# Patient Record
Sex: FEMALE | Race: WHITE | NOT HISPANIC OR LATINO | Employment: FULL TIME | URBAN - METROPOLITAN AREA
[De-identification: names, ages, dates, MRNs, and addresses within clinical notes are randomized per-mention and may not be internally consistent; named-entity substitution may affect disease eponyms.]

---

## 2017-01-26 ENCOUNTER — OFFICE VISIT (OUTPATIENT)
Dept: URGENT CARE | Facility: PHYSICIAN GROUP | Age: 42
End: 2017-01-26
Payer: COMMERCIAL

## 2017-01-26 VITALS
TEMPERATURE: 98.2 F | HEIGHT: 65 IN | SYSTOLIC BLOOD PRESSURE: 120 MMHG | RESPIRATION RATE: 16 BRPM | DIASTOLIC BLOOD PRESSURE: 82 MMHG | WEIGHT: 140 LBS | BODY MASS INDEX: 23.32 KG/M2 | OXYGEN SATURATION: 100 % | HEART RATE: 76 BPM

## 2017-01-26 DIAGNOSIS — J02.9 PHARYNGITIS, UNSPECIFIED ETIOLOGY: ICD-10-CM

## 2017-01-26 PROCEDURE — 99213 OFFICE O/P EST LOW 20 MIN: CPT | Performed by: FAMILY MEDICINE

## 2017-01-26 RX ORDER — AMOXICILLIN 875 MG/1
875 TABLET, COATED ORAL 2 TIMES DAILY
Qty: 20 TAB | Refills: 0 | Status: SHIPPED | OUTPATIENT
Start: 2017-01-26 | End: 2017-02-05

## 2017-01-26 ASSESSMENT — ENCOUNTER SYMPTOMS
WEIGHT LOSS: 0
SENSORY CHANGE: 0
FOCAL WEAKNESS: 0

## 2017-01-26 NOTE — MR AVS SNAPSHOT
"        Dorothy Nova   2017 3:45 PM   Office Visit   MRN: [id number]    Department:  [facility]   Dept Phone:  [phone]    Description:  Female : 1975   Provider:  Frederick Gallardo M.D.           Reason for Visit     Pharyngitis x 2 days       Allergies as of 2017     No Known Allergies      You were diagnosed with     Pharyngitis, unspecified etiology   [[id number]]         Vital Signs     Blood Pressure Pulse Temperature Respirations Height Weight    120/82 mmHg 76 36.8 °C (98.2 °F) 16 1.651 m (5' 5\") 63.504 kg (140 lb)    Body Mass Index Oxygen Saturation Smoking Status             23.30 kg/m2 100% Never Smoker          Basic Information     Date Of Birth Sex Race Ethnicity Preferred Language    1975 Female White Non- English      Health Maintenance        Date Due Completion Dates    IMM DTaP/Tdap/Td Vaccine (1 - Tdap) 3/6/1996 ---    PAP SMEAR 3/6/1998 ---    IMM INFLUENZA (1) 2016 ---            Current Immunizations     No immunizations on file.      Below and/or attached are the medications your provider expects you to take. Review all of your home medications and newly ordered medications with your provider and/or pharmacist. Follow medication instructions as directed by your provider and/or pharmacist. Please keep your medication list with you and share with your provider. Update the information when medications are discontinued, doses are changed, or new medications (including over-the-counter products) are added; and carry medication information at all times in the event of emergency situations     Allergies:  No Known Allergies          Medications  Valid as of: 2017 -  4:12 PM    Generic Name Brand Name Tablet Size Instructions for use    Amoxicillin (Tab) AMOXIL 875 MG Take 1 Tab by mouth 2 times a day for 10 days.        Magnesium Oxide (Tab) MAG- MG Take 400 mg by mouth every day.        Omega 3-6-9 Fatty Acids   Take  by mouth.       " RaNITidine HCl (Tab) ZANTAC 150 MG Take 1 Tab by mouth 2 times a day.        .                 Medicines prescribed today were sent to:     [facility]/PHARMACY #[id number] - [place], [place] - [address] [address]    [address] [place] [address] [place] [address]    Phone: [phone] Fax: [phone]    Open 24 Hours?: No      Medication refill instructions:       If your prescription bottle indicates you have medication refills left, it is not necessary to call your provider’s office. Please contact your pharmacy and they will refill your medication.    If your prescription bottle indicates you do not have any refills left, you may request refills at any time through one of the following ways: The online eSeekers system (except Urgent Care), by calling your provider’s office, or by asking your pharmacy to contact your provider’s office with a refill request. Medication refills are processed only during regular business hours and may not be available until the next business day. Your provider may request additional information or to have a follow-up visit with you prior to refilling your medication.   *Please Note: Medication refills are assigned a new Rx number when refilled electronically. Your pharmacy may indicate that no refills were authorized even though a new prescription for the same medication is available at the pharmacy. Please request the medicine by name with the pharmacy before contacting your provider for a refill.           eSeekers Access Code: [email or web address]  Expires: 2/7/2017  9:00 AM    eSeekers  A secure, online tool to manage your health information     [employer]’s eSeekers® is a secure, online tool that connects you to your personalized health information from the privacy of your home -- day or night - making it very easy for you to manage your healthcare. Once the activation process is completed, you can even access your medical information using the eSeekers neville, which is available for free in the Apple [employer] store or  Google Play store.     [facility] provides the following levels of access (as shown below):   My Chart Features   [facility] Doctor [facility]  Specialists [facility]  Urgent  Care Non-[facility]  Primary Care  Doctor   Email your healthcare team securely and privately 24/7 X X X    Manage appointments: schedule your next appointment; view details of past/upcoming appointments X      Request prescription refills. X      View recent personal medical records, including lab and immunizations X X X X   View health record, including health history, allergies, medications X X X X   Read reports about your outpatient visits, procedures, consult and ER notes X X X X   See your discharge summary, which is a recap of your hospital and/or ER visit that includes your diagnosis, lab results, and care plan. X X       How to register for [facility]:  1. Go to  [email or web address].  2. Click on the Sign Up Now box, which takes you to the New Member Sign Up page. You will need to provide the following information:  a. Enter your [facility] Access Code exactly as it appears at the top of this page. (You will not need to use this code after you’ve completed the sign-up process. If you do not sign up before the expiration date, you must request a new code.)   b. Enter your date of birth.   c. Enter your home email address.   d. Click Submit, and follow the next screen’s instructions.  3. Create a [facility] ID. This will be your [facility] login ID and cannot be changed, so think of one that is secure and easy to remember.  4. Create a [facility] password. You can change your password at any time.  5. Enter your Password Reset Question and Answer. This can be used at a later time if you forget your password.   6. Enter your e-mail address. This allows you to receive e-mail notifications when new information is available in [facility].  7. Click Sign Up. You can now view your health information.    For assistance activating your [facility] account, call  [phone]

## 2017-01-27 NOTE — PROGRESS NOTES
"Subjective:      Dorothy Nova is a 41 y.o. female who presents with Pharyngitis            Pharyngitis   This is a new problem. Episode onset: 2 days right sided ST with blister and swelling. Swollen lymph nodes. No fever. Tolerating fluids with normal urine output. No rash. No known strep exposure. Right earache. No drainage. +PMH otitis media as adult. +PMH recurrent strep negative pharyngitis.       Review of Systems   Constitutional: Negative for weight loss and malaise/fatigue.   Skin: Negative for itching and rash.   Neurological: Negative for sensory change and focal weakness.     .  Medications, Allergies, and current problem list reviewed today in Epic       Objective:     /82 mmHg  Pulse 76  Temp(Src) 36.8 °C (98.2 °F)  Resp 16  Ht 1.651 m (5' 5\")  Wt 63.504 kg (140 lb)  BMI 23.30 kg/m2  SpO2 100%     Physical Exam   Constitutional: She appears well-developed and well-nourished. No distress.   HENT:   Head: Normocephalic and atraumatic.   Pharynx red without exudate. Small right soft palate aphthous ulcer.   Eyes: Conjunctivae are normal.   Neck: Neck supple.   Lymphadenopathy:     She has cervical adenopathy (shotty anterior).   Neurological:   Speech is clear. Patient is appropriate and cooperative.     Skin: Skin is warm and dry. No rash noted.               Assessment/Plan:     1. Pharyngitis, unspecified etiology  amoxicillin (AMOXIL) 875 MG tablet     Differential diagnosis, natural history, supportive care, and indications for immediate follow-up discussed at length.   High probability viral etiology discussed  Contingent antibiotic prescription given to patient to fill upon meeting criteria of guidelines discussed.       "

## 2017-03-19 ENCOUNTER — OFFICE VISIT (OUTPATIENT)
Dept: URGENT CARE | Facility: PHYSICIAN GROUP | Age: 42
End: 2017-03-19
Payer: COMMERCIAL

## 2017-03-19 VITALS
DIASTOLIC BLOOD PRESSURE: 72 MMHG | RESPIRATION RATE: 16 BRPM | HEIGHT: 65 IN | OXYGEN SATURATION: 98 % | TEMPERATURE: 98.7 F | BODY MASS INDEX: 23.32 KG/M2 | HEART RATE: 81 BPM | WEIGHT: 140 LBS | SYSTOLIC BLOOD PRESSURE: 124 MMHG

## 2017-03-19 DIAGNOSIS — R59.1 LYMPHADENOPATHY: ICD-10-CM

## 2017-03-19 DIAGNOSIS — J02.9 PHARYNGITIS, UNSPECIFIED ETIOLOGY: ICD-10-CM

## 2017-03-19 LAB
INT CON NEG: NORMAL
INT CON POS: NORMAL
S PYO AG THROAT QL: NORMAL

## 2017-03-19 PROCEDURE — 99214 OFFICE O/P EST MOD 30 MIN: CPT | Performed by: FAMILY MEDICINE

## 2017-03-19 PROCEDURE — 87880 STREP A ASSAY W/OPTIC: CPT | Performed by: FAMILY MEDICINE

## 2017-03-19 RX ORDER — DIPHENHYDRAMINE HYDROCHLORIDE AND LIDOCAINE HYDROCHLORIDE AND ALUMINUM HYDROXIDE AND MAGNESIUM HYDRO
KIT
Qty: 240 ML | Refills: 0 | Status: SHIPPED | OUTPATIENT
Start: 2017-03-19 | End: 2022-04-25

## 2017-03-19 RX ORDER — AMOXICILLIN 875 MG/1
875 TABLET, COATED ORAL 2 TIMES DAILY
Qty: 20 TAB | Refills: 0 | Status: SHIPPED | OUTPATIENT
Start: 2017-03-19 | End: 2017-03-29

## 2017-03-19 ASSESSMENT — ENCOUNTER SYMPTOMS
WEIGHT LOSS: 0
EYE DISCHARGE: 0
HEADACHES: 0
MYALGIAS: 1
EYE REDNESS: 0

## 2017-03-19 NOTE — MR AVS SNAPSHOT
"        Dorothy Nova   3/19/2017 10:45 AM   Office Visit   MRN: [id number]    Department:  [facility]   Dept Phone:  [phone]    Description:  Female : 1975   Provider:  Frederick Gallardo M.D.           Reason for Visit     Pharyngitis lump on the left side of neck and stiff neck x 3 days      Allergies as of 3/19/2017     No Known Allergies      You were diagnosed with     Pharyngitis, unspecified etiology   [[id number]]       Lymphadenopathy   [[id number]]         Vital Signs     Blood Pressure Pulse Temperature Respirations Height Weight    124/72 mmHg 81 37.1 °C (98.7 °F) 16 1.651 m (5' 5\") 63.504 kg (140 lb)    Body Mass Index Oxygen Saturation Smoking Status             23.30 kg/m2 98% Never Smoker          Basic Information     Date Of Birth Sex Race Ethnicity Preferred Language    1975 Female White Non- English      Health Maintenance        Date Due Completion Dates    IMM DTaP/Tdap/Td Vaccine (1 - Tdap) 3/6/1996 ---    PAP SMEAR 3/6/1998 ---    IMM INFLUENZA (1) 2016 ---    MAMMOGRAM 3/6/2017 ---            Current Immunizations     No immunizations on file.      Below and/or attached are the medications your provider expects you to take. Review all of your home medications and newly ordered medications with your provider and/or pharmacist. Follow medication instructions as directed by your provider and/or pharmacist. Please keep your medication list with you and share with your provider. Update the information when medications are discontinued, doses are changed, or new medications (including over-the-counter products) are added; and carry medication information at all times in the event of emergency situations     Allergies:  No Known Allergies          Medications  Valid as of: 2017 - 11:57 AM    Generic Name Brand Name Tablet Size Instructions for use    Amoxicillin (Tab) AMOXIL 875 MG Take 1 Tab by mouth 2 times a day for 10 days.       " DPH-Lido-AlHydr-MgHydr-Simeth (Suspension) MAGIC MOUTHWASH BLM  15ml-30ml gargle and spit every 3 hours as needed        Magnesium Oxide (Tab) MAG- MG Take 400 mg by mouth every day.        Omega 3-6-9 Fatty Acids   Take  by mouth.        RaNITidine HCl (Tab) ZANTAC 150 MG Take 1 Tab by mouth 2 times a day.        .                 Medicines prescribed today were sent to:     [facility]/PHARMACY #[id number] - [place], [place] - [address]    [address] [place] [address] [place] [address]    Phone: [phone] Fax: [phone]    Open 24 Hours?: No      Medication refill instructions:       If your prescription bottle indicates you have medication refills left, it is not necessary to call your provider’s office. Please contact your pharmacy and they will refill your medication.    If your prescription bottle indicates you do not have any refills left, you may request refills at any time through one of the following ways: The online HelloBooks system (except Urgent Care), by calling your provider’s office, or by asking your pharmacy to contact your provider’s office with a refill request. Medication refills are processed only during regular business hours and may not be available until the next business day. Your provider may request additional information or to have a follow-up visit with you prior to refilling your medication.   *Please Note: Medication refills are assigned a new Rx number when refilled electronically. Your pharmacy may indicate that no refills were authorized even though a new prescription for the same medication is available at the pharmacy. Please request the medicine by name with the pharmacy before contacting your provider for a refill.           [facility] Status: Patient Declined

## 2017-03-19 NOTE — PROGRESS NOTES
"Subjective:      Dorothy Nova is a 42 y.o. female who presents with Pharyngitis            Pharyngitis   This is a new problem. Episode onset: 3 days ST, swollen left tonsil with swollen left anterior cervical lymph node. No fever. No cough congestion. Tolerating fluids with normal urine output. No rash. Using OTC sudafed without relief . Associated symptoms include ear pain (left). Pertinent negatives include no headaches.   No other aggravating or alleviating factors.      Review of Systems   Constitutional: Negative for weight loss and malaise/fatigue.   HENT: Positive for ear pain (left).    Eyes: Negative for discharge and redness.   Musculoskeletal: Positive for myalgias.   Skin: Negative for itching and rash.   Neurological: Negative for headaches.   .  Medications, Allergies, and current problem list reviewed today in Epic         Objective:     /72 mmHg  Pulse 81  Temp(Src) 37.1 °C (98.7 °F)  Resp 16  Ht 1.651 m (5' 5\")  Wt 63.504 kg (140 lb)  BMI 23.30 kg/m2  SpO2 98%     Physical Exam   Constitutional: She appears well-developed and well-nourished. No distress.   HENT:   Head: Normocephalic and atraumatic.   Right Ear: External ear normal.   Left Ear: External ear normal.   Pharynx red without exudate     Eyes: Conjunctivae are normal.   Neck: Neck supple.   Lymphadenopathy:     She has cervical adenopathy (tender anterior).   Neurological:   Speech is clear. Patient is appropriate and cooperative.     Skin: Skin is warm and dry. No rash noted.               Assessment/Plan:     Strep negative    1. Pharyngitis, unspecified etiology  POCT Rapid Strep A   2. Lymphadenopathy  POCT Rapid Strep A     Differential diagnosis, natural history, supportive care, and indications for immediate follow-up discussed at length.   Contingent antibiotic prescription given to patient to fill upon meeting criteria of guidelines discussed.   Magic mouthwash prn    "

## 2018-08-08 ENCOUNTER — HOSPITAL ENCOUNTER (OUTPATIENT)
Dept: LAB | Facility: MEDICAL CENTER | Age: 43
End: 2018-08-08
Attending: FAMILY MEDICINE
Payer: COMMERCIAL

## 2018-08-08 ENCOUNTER — HOSPITAL ENCOUNTER (OUTPATIENT)
Dept: RADIOLOGY | Facility: MEDICAL CENTER | Age: 43
End: 2018-08-08
Attending: FAMILY MEDICINE
Payer: COMMERCIAL

## 2018-08-08 DIAGNOSIS — M25.541 JOINT PAIN IN FINGERS OF RIGHT HAND: ICD-10-CM

## 2018-08-08 LAB
BASOPHILS # BLD AUTO: 0.7 % (ref 0–1.8)
BASOPHILS # BLD: 0.05 K/UL (ref 0–0.12)
CRP SERPL HS-MCNC: 0.06 MG/DL (ref 0–0.75)
EOSINOPHIL # BLD AUTO: 0.14 K/UL (ref 0–0.51)
EOSINOPHIL NFR BLD: 1.9 % (ref 0–6.9)
ERYTHROCYTE [DISTWIDTH] IN BLOOD BY AUTOMATED COUNT: 44.6 FL (ref 35.9–50)
ERYTHROCYTE [SEDIMENTATION RATE] IN BLOOD BY WESTERGREN METHOD: 4 MM/HOUR (ref 0–20)
HCT VFR BLD AUTO: 44.8 % (ref 37–47)
HGB BLD-MCNC: 14.8 G/DL (ref 12–16)
IMM GRANULOCYTES # BLD AUTO: 0.02 K/UL (ref 0–0.11)
IMM GRANULOCYTES NFR BLD AUTO: 0.3 % (ref 0–0.9)
LYMPHOCYTES # BLD AUTO: 1.96 K/UL (ref 1–4.8)
LYMPHOCYTES NFR BLD: 26.6 % (ref 22–41)
MCH RBC QN AUTO: 32.2 PG (ref 27–33)
MCHC RBC AUTO-ENTMCNC: 33 G/DL (ref 33.6–35)
MCV RBC AUTO: 97.6 FL (ref 81.4–97.8)
MONOCYTES # BLD AUTO: 0.57 K/UL (ref 0–0.85)
MONOCYTES NFR BLD AUTO: 7.7 % (ref 0–13.4)
NEUTROPHILS # BLD AUTO: 4.62 K/UL (ref 2–7.15)
NEUTROPHILS NFR BLD: 62.8 % (ref 44–72)
NRBC # BLD AUTO: 0 K/UL
NRBC BLD-RTO: 0 /100 WBC
PLATELET # BLD AUTO: 358 K/UL (ref 164–446)
PMV BLD AUTO: 10.2 FL (ref 9–12.9)
RBC # BLD AUTO: 4.59 M/UL (ref 4.2–5.4)
RHEUMATOID FACT SER IA-ACNC: <10 IU/ML (ref 0–14)
WBC # BLD AUTO: 7.4 K/UL (ref 4.8–10.8)

## 2018-08-08 PROCEDURE — 73140 X-RAY EXAM OF FINGER(S): CPT | Mod: RT

## 2018-08-08 PROCEDURE — 86038 ANTINUCLEAR ANTIBODIES: CPT

## 2018-08-08 PROCEDURE — 85652 RBC SED RATE AUTOMATED: CPT

## 2018-08-08 PROCEDURE — 86431 RHEUMATOID FACTOR QUANT: CPT

## 2018-08-08 PROCEDURE — 86140 C-REACTIVE PROTEIN: CPT

## 2018-08-08 PROCEDURE — 85025 COMPLETE CBC W/AUTO DIFF WBC: CPT

## 2018-08-08 PROCEDURE — 36415 COLL VENOUS BLD VENIPUNCTURE: CPT

## 2018-08-09 LAB — NUCLEAR IGG SER QL IA: NORMAL

## 2020-07-17 ENCOUNTER — HOSPITAL ENCOUNTER (OUTPATIENT)
Dept: LAB | Facility: MEDICAL CENTER | Age: 45
End: 2020-07-17
Attending: PHYSICIAN ASSISTANT
Payer: COMMERCIAL

## 2020-07-17 PROCEDURE — 87624 HPV HI-RISK TYP POOLED RSLT: CPT

## 2020-07-17 PROCEDURE — 88175 CYTOPATH C/V AUTO FLUID REDO: CPT

## 2020-07-25 LAB
CYTOLOGY REG CYTOL: NORMAL
HPV HR 12 DNA CVX QL NAA+PROBE: NEGATIVE
HPV16 DNA SPEC QL NAA+PROBE: NEGATIVE
HPV18 DNA SPEC QL NAA+PROBE: NEGATIVE
SPECIMEN SOURCE: NORMAL

## 2022-03-07 ENCOUNTER — APPOINTMENT (OUTPATIENT)
Dept: RADIOLOGY | Facility: MEDICAL CENTER | Age: 47
End: 2022-03-07
Attending: NURSE PRACTITIONER
Payer: COMMERCIAL

## 2022-03-21 ENCOUNTER — HOSPITAL ENCOUNTER (OUTPATIENT)
Dept: RADIOLOGY | Facility: MEDICAL CENTER | Age: 47
End: 2022-03-21
Attending: NURSE PRACTITIONER
Payer: COMMERCIAL

## 2022-03-21 DIAGNOSIS — M79.672 FOOT PAIN, LEFT: ICD-10-CM

## 2022-03-21 PROCEDURE — A9576 INJ PROHANCE MULTIPACK: HCPCS | Performed by: NURSE PRACTITIONER

## 2022-03-21 PROCEDURE — 73720 MRI LWR EXTREMITY W/O&W/DYE: CPT | Mod: LT

## 2022-03-21 PROCEDURE — 700117 HCHG RX CONTRAST REV CODE 255: Performed by: NURSE PRACTITIONER

## 2022-03-21 RX ADMIN — GADOTERIDOL 10 ML: 279.3 INJECTION, SOLUTION INTRAVENOUS at 17:30

## 2022-03-23 PROBLEM — M79.672 FOOT PAIN, LEFT: Status: ACTIVE | Noted: 2022-03-23

## 2022-04-25 PROBLEM — M67.441 GANGLION OF HAND, RIGHT: Status: ACTIVE | Noted: 2022-04-25

## 2022-06-06 ENCOUNTER — HOSPITAL ENCOUNTER (OUTPATIENT)
Facility: MEDICAL CENTER | Age: 47
End: 2022-06-06
Attending: ANESTHESIOLOGY
Payer: COMMERCIAL

## 2022-06-06 LAB
COVID ORDER STATUS COVID19: NORMAL
SARS-COV-2 RNA RESP QL NAA+PROBE: NOTDETECTED
SPECIMEN SOURCE: NORMAL

## 2022-06-06 PROCEDURE — U0003 INFECTIOUS AGENT DETECTION BY NUCLEIC ACID (DNA OR RNA); SEVERE ACUTE RESPIRATORY SYNDROME CORONAVIRUS 2 (SARS-COV-2) (CORONAVIRUS DISEASE [COVID-19]), AMPLIFIED PROBE TECHNIQUE, MAKING USE OF HIGH THROUGHPUT TECHNOLOGIES AS DESCRIBED BY CMS-2020-01-R: HCPCS

## 2022-06-06 PROCEDURE — U0005 INFEC AGEN DETEC AMPLI PROBE: HCPCS

## 2022-06-09 PROBLEM — Z47.89 ORTHOPEDIC AFTERCARE: Status: ACTIVE | Noted: 2022-06-09

## 2023-04-12 DIAGNOSIS — M79.662 PAIN OF LEFT CALF: ICD-10-CM

## 2023-04-12 NOTE — PROGRESS NOTES
Acute onset left calf pain and swelling with extension to the distal thigh. Tenderness along the venous distribution. Wells Score: 1. D-dimer as pt is in low risk group.

## 2023-07-17 PROBLEM — R22.31 MASS OF FINGER OF RIGHT HAND: Status: ACTIVE | Noted: 2023-07-17

## 2024-09-15 ENCOUNTER — APPOINTMENT (OUTPATIENT)
Dept: RADIOLOGY | Facility: MEDICAL CENTER | Age: 49
DRG: 418 | End: 2024-09-15
Attending: EMERGENCY MEDICINE
Payer: COMMERCIAL

## 2024-09-15 ENCOUNTER — APPOINTMENT (OUTPATIENT)
Dept: RADIOLOGY | Facility: MEDICAL CENTER | Age: 49
DRG: 418 | End: 2024-09-15
Attending: INTERNAL MEDICINE
Payer: COMMERCIAL

## 2024-09-15 ENCOUNTER — HOSPITAL ENCOUNTER (INPATIENT)
Facility: MEDICAL CENTER | Age: 49
LOS: 2 days | DRG: 418 | End: 2024-09-17
Attending: EMERGENCY MEDICINE | Admitting: INTERNAL MEDICINE
Payer: COMMERCIAL

## 2024-09-15 DIAGNOSIS — K92.1 GASTROINTESTINAL HEMORRHAGE WITH MELENA: ICD-10-CM

## 2024-09-15 DIAGNOSIS — K82.1 GALLBLADDER HYDROPS: ICD-10-CM

## 2024-09-15 DIAGNOSIS — R10.13 EPIGASTRIC PAIN: ICD-10-CM

## 2024-09-15 DIAGNOSIS — K81.0 ACUTE CHOLECYSTITIS: ICD-10-CM

## 2024-09-15 DIAGNOSIS — D72.829 LEUKOCYTOSIS, UNSPECIFIED TYPE: ICD-10-CM

## 2024-09-15 DIAGNOSIS — K92.2 ACUTE UPPER GI BLEED: Primary | ICD-10-CM

## 2024-09-15 PROBLEM — R09.02 HYPOXIA: Status: ACTIVE | Noted: 2024-09-15

## 2024-09-15 LAB
ABO + RH BLD: NORMAL
ABO GROUP BLD: NORMAL
ALBUMIN SERPL BCP-MCNC: 4.4 G/DL (ref 3.2–4.9)
ALBUMIN/GLOB SERPL: 1.6 G/DL
ALP SERPL-CCNC: 55 U/L (ref 30–99)
ALT SERPL-CCNC: 13 U/L (ref 2–50)
ANION GAP SERPL CALC-SCNC: 14 MMOL/L (ref 7–16)
APPEARANCE UR: CLEAR
AST SERPL-CCNC: 25 U/L (ref 12–45)
BASOPHILS # BLD AUTO: 0.3 % (ref 0–1.8)
BASOPHILS # BLD: 0.05 K/UL (ref 0–0.12)
BILIRUB SERPL-MCNC: 0.7 MG/DL (ref 0.1–1.5)
BILIRUB UR QL STRIP.AUTO: NEGATIVE
BLD GP AB SCN SERPL QL: NORMAL
BUN SERPL-MCNC: 13 MG/DL (ref 8–22)
CALCIUM ALBUM COR SERPL-MCNC: 9.2 MG/DL (ref 8.5–10.5)
CALCIUM SERPL-MCNC: 9.5 MG/DL (ref 8.5–10.5)
CHLORIDE SERPL-SCNC: 99 MMOL/L (ref 96–112)
CO2 SERPL-SCNC: 23 MMOL/L (ref 20–33)
COLOR UR: YELLOW
CREAT SERPL-MCNC: 0.77 MG/DL (ref 0.5–1.4)
EKG IMPRESSION: NORMAL
EOSINOPHIL # BLD AUTO: 0 K/UL (ref 0–0.51)
EOSINOPHIL NFR BLD: 0 % (ref 0–6.9)
ERYTHROCYTE [DISTWIDTH] IN BLOOD BY AUTOMATED COUNT: 41.2 FL (ref 35.9–50)
GFR SERPLBLD CREATININE-BSD FMLA CKD-EPI: 95 ML/MIN/1.73 M 2
GLOBULIN SER CALC-MCNC: 2.8 G/DL (ref 1.9–3.5)
GLUCOSE SERPL-MCNC: 121 MG/DL (ref 65–99)
GLUCOSE UR STRIP.AUTO-MCNC: NEGATIVE MG/DL
HCT VFR BLD AUTO: 41.6 % (ref 37–47)
HGB BLD-MCNC: 11.1 G/DL (ref 12–16)
HGB BLD-MCNC: 12.2 G/DL (ref 12–16)
HGB BLD-MCNC: 14.6 G/DL (ref 12–16)
IMM GRANULOCYTES # BLD AUTO: 0.08 K/UL (ref 0–0.11)
IMM GRANULOCYTES NFR BLD AUTO: 0.5 % (ref 0–0.9)
KETONES UR STRIP.AUTO-MCNC: NEGATIVE MG/DL
LEUKOCYTE ESTERASE UR QL STRIP.AUTO: NEGATIVE
LIPASE SERPL-CCNC: 24 U/L (ref 11–82)
LYMPHOCYTES # BLD AUTO: 0.91 K/UL (ref 1–4.8)
LYMPHOCYTES NFR BLD: 5.7 % (ref 22–41)
MCH RBC QN AUTO: 32.1 PG (ref 27–33)
MCHC RBC AUTO-ENTMCNC: 35.1 G/DL (ref 32.2–35.5)
MCV RBC AUTO: 91.4 FL (ref 81.4–97.8)
MICRO URNS: NORMAL
MONOCYTES # BLD AUTO: 0.71 K/UL (ref 0–0.85)
MONOCYTES NFR BLD AUTO: 4.5 % (ref 0–13.4)
NEUTROPHILS # BLD AUTO: 14.12 K/UL (ref 1.82–7.42)
NEUTROPHILS NFR BLD: 89 % (ref 44–72)
NITRITE UR QL STRIP.AUTO: NEGATIVE
NRBC # BLD AUTO: 0 K/UL
NRBC BLD-RTO: 0 /100 WBC (ref 0–0.2)
PH UR STRIP.AUTO: 7 [PH] (ref 5–8)
PLATELET # BLD AUTO: 337 K/UL (ref 164–446)
PMV BLD AUTO: 9.4 FL (ref 9–12.9)
POTASSIUM SERPL-SCNC: 3.8 MMOL/L (ref 3.6–5.5)
PROT SERPL-MCNC: 7.2 G/DL (ref 6–8.2)
PROT UR QL STRIP: NEGATIVE MG/DL
RBC # BLD AUTO: 4.55 M/UL (ref 4.2–5.4)
RBC UR QL AUTO: NEGATIVE
RH BLD: NORMAL
SODIUM SERPL-SCNC: 136 MMOL/L (ref 135–145)
SP GR UR STRIP.AUTO: 1.01
TROPONIN T SERPL-MCNC: <6 NG/L (ref 6–19)
UROBILINOGEN UR STRIP.AUTO-MCNC: 0.2 MG/DL
WBC # BLD AUTO: 15.9 K/UL (ref 4.8–10.8)

## 2024-09-15 PROCEDURE — 86901 BLOOD TYPING SEROLOGIC RH(D): CPT

## 2024-09-15 PROCEDURE — 99223 1ST HOSP IP/OBS HIGH 75: CPT | Performed by: INTERNAL MEDICINE

## 2024-09-15 PROCEDURE — 700105 HCHG RX REV CODE 258: Performed by: INTERNAL MEDICINE

## 2024-09-15 PROCEDURE — 81003 URINALYSIS AUTO W/O SCOPE: CPT

## 2024-09-15 PROCEDURE — 93005 ELECTROCARDIOGRAM TRACING: CPT | Performed by: EMERGENCY MEDICINE

## 2024-09-15 PROCEDURE — 85025 COMPLETE CBC W/AUTO DIFF WBC: CPT

## 2024-09-15 PROCEDURE — 85018 HEMOGLOBIN: CPT

## 2024-09-15 PROCEDURE — 770006 HCHG ROOM/CARE - MED/SURG/GYN SEMI*

## 2024-09-15 PROCEDURE — 86900 BLOOD TYPING SEROLOGIC ABO: CPT

## 2024-09-15 PROCEDURE — 80053 COMPREHEN METABOLIC PANEL: CPT

## 2024-09-15 PROCEDURE — 99222 1ST HOSP IP/OBS MODERATE 55: CPT | Performed by: SURGERY

## 2024-09-15 PROCEDURE — 86850 RBC ANTIBODY SCREEN: CPT

## 2024-09-15 PROCEDURE — 94760 N-INVAS EAR/PLS OXIMETRY 1: CPT

## 2024-09-15 PROCEDURE — 700117 HCHG RX CONTRAST REV CODE 255: Performed by: EMERGENCY MEDICINE

## 2024-09-15 PROCEDURE — 76705 ECHO EXAM OF ABDOMEN: CPT

## 2024-09-15 PROCEDURE — 83690 ASSAY OF LIPASE: CPT

## 2024-09-15 PROCEDURE — 700102 HCHG RX REV CODE 250 W/ 637 OVERRIDE(OP): Performed by: INTERNAL MEDICINE

## 2024-09-15 PROCEDURE — 700102 HCHG RX REV CODE 250 W/ 637 OVERRIDE(OP): Performed by: EMERGENCY MEDICINE

## 2024-09-15 PROCEDURE — 36415 COLL VENOUS BLD VENIPUNCTURE: CPT

## 2024-09-15 PROCEDURE — A9270 NON-COVERED ITEM OR SERVICE: HCPCS | Mod: JZ | Performed by: INTERNAL MEDICINE

## 2024-09-15 PROCEDURE — 700111 HCHG RX REV CODE 636 W/ 250 OVERRIDE (IP): Performed by: EMERGENCY MEDICINE

## 2024-09-15 PROCEDURE — A9270 NON-COVERED ITEM OR SERVICE: HCPCS | Performed by: EMERGENCY MEDICINE

## 2024-09-15 PROCEDURE — 99285 EMERGENCY DEPT VISIT HI MDM: CPT

## 2024-09-15 PROCEDURE — 84484 ASSAY OF TROPONIN QUANT: CPT

## 2024-09-15 PROCEDURE — A9270 NON-COVERED ITEM OR SERVICE: HCPCS | Performed by: INTERNAL MEDICINE

## 2024-09-15 PROCEDURE — 99254 IP/OBS CNSLTJ NEW/EST MOD 60: CPT | Performed by: INTERNAL MEDICINE

## 2024-09-15 PROCEDURE — 96374 THER/PROPH/DIAG INJ IV PUSH: CPT

## 2024-09-15 PROCEDURE — 700111 HCHG RX REV CODE 636 W/ 250 OVERRIDE (IP): Performed by: INTERNAL MEDICINE

## 2024-09-15 PROCEDURE — 700102 HCHG RX REV CODE 250 W/ 637 OVERRIDE(OP): Mod: JZ | Performed by: INTERNAL MEDICINE

## 2024-09-15 PROCEDURE — 96375 TX/PRO/DX INJ NEW DRUG ADDON: CPT

## 2024-09-15 PROCEDURE — 74177 CT ABD & PELVIS W/CONTRAST: CPT

## 2024-09-15 RX ORDER — POTASSIUM CHLORIDE 1500 MG/1
20 TABLET, EXTENDED RELEASE ORAL ONCE
Status: COMPLETED | OUTPATIENT
Start: 2024-09-15 | End: 2024-09-15

## 2024-09-15 RX ORDER — ONDANSETRON 2 MG/ML
4 INJECTION INTRAMUSCULAR; INTRAVENOUS EVERY 4 HOURS PRN
Status: DISCONTINUED | OUTPATIENT
Start: 2024-09-15 | End: 2024-09-17 | Stop reason: HOSPADM

## 2024-09-15 RX ORDER — AMOXICILLIN 250 MG
2 CAPSULE ORAL EVERY EVENING
Status: DISCONTINUED | OUTPATIENT
Start: 2024-09-15 | End: 2024-09-17 | Stop reason: HOSPADM

## 2024-09-15 RX ORDER — DEXTROAMPHETAMINE SACCHARATE, AMPHETAMINE ASPARTATE, DEXTROAMPHETAMINE SULFATE AND AMPHETAMINE SULFATE 2.5; 2.5; 2.5; 2.5 MG/1; MG/1; MG/1; MG/1
10 TABLET ORAL DAILY
Status: DISCONTINUED | OUTPATIENT
Start: 2024-09-15 | End: 2024-09-17 | Stop reason: HOSPADM

## 2024-09-15 RX ORDER — OXYCODONE HYDROCHLORIDE 5 MG/1
2.5 TABLET ORAL
Status: DISCONTINUED | OUTPATIENT
Start: 2024-09-15 | End: 2024-09-17 | Stop reason: HOSPADM

## 2024-09-15 RX ORDER — LABETALOL HYDROCHLORIDE 5 MG/ML
10 INJECTION, SOLUTION INTRAVENOUS EVERY 4 HOURS PRN
Status: DISCONTINUED | OUTPATIENT
Start: 2024-09-15 | End: 2024-09-17 | Stop reason: HOSPADM

## 2024-09-15 RX ORDER — ONDANSETRON 2 MG/ML
4 INJECTION INTRAMUSCULAR; INTRAVENOUS ONCE
Status: COMPLETED | OUTPATIENT
Start: 2024-09-15 | End: 2024-09-15

## 2024-09-15 RX ORDER — PROMETHAZINE HYDROCHLORIDE 25 MG/1
12.5-25 TABLET ORAL EVERY 4 HOURS PRN
Status: DISCONTINUED | OUTPATIENT
Start: 2024-09-15 | End: 2024-09-17 | Stop reason: HOSPADM

## 2024-09-15 RX ORDER — PANTOPRAZOLE SODIUM 40 MG/10ML
80 INJECTION, POWDER, LYOPHILIZED, FOR SOLUTION INTRAVENOUS ONCE
Status: COMPLETED | OUTPATIENT
Start: 2024-09-15 | End: 2024-09-15

## 2024-09-15 RX ORDER — ACETAMINOPHEN 325 MG/1
650 TABLET ORAL EVERY 6 HOURS PRN
Status: DISCONTINUED | OUTPATIENT
Start: 2024-09-15 | End: 2024-09-17 | Stop reason: HOSPADM

## 2024-09-15 RX ORDER — SUCRALFATE 1 G/1
1 TABLET ORAL EVERY 6 HOURS
Status: COMPLETED | OUTPATIENT
Start: 2024-09-15 | End: 2024-09-16

## 2024-09-15 RX ORDER — MORPHINE SULFATE 4 MG/ML
2 INJECTION INTRAVENOUS
Status: DISCONTINUED | OUTPATIENT
Start: 2024-09-15 | End: 2024-09-17 | Stop reason: HOSPADM

## 2024-09-15 RX ORDER — LIOTHYRONINE SODIUM 5 UG/1
5 TABLET ORAL
Status: DISCONTINUED | OUTPATIENT
Start: 2024-09-15 | End: 2024-09-15

## 2024-09-15 RX ORDER — HYDROMORPHONE HYDROCHLORIDE 1 MG/ML
1 INJECTION, SOLUTION INTRAMUSCULAR; INTRAVENOUS; SUBCUTANEOUS ONCE
Status: COMPLETED | OUTPATIENT
Start: 2024-09-15 | End: 2024-09-15

## 2024-09-15 RX ORDER — ONDANSETRON 4 MG/1
4 TABLET, ORALLY DISINTEGRATING ORAL EVERY 4 HOURS PRN
Status: DISCONTINUED | OUTPATIENT
Start: 2024-09-15 | End: 2024-09-17 | Stop reason: HOSPADM

## 2024-09-15 RX ORDER — PANTOPRAZOLE SODIUM 40 MG/10ML
40 INJECTION, POWDER, LYOPHILIZED, FOR SOLUTION INTRAVENOUS 2 TIMES DAILY
Status: DISCONTINUED | OUTPATIENT
Start: 2024-09-15 | End: 2024-09-17 | Stop reason: HOSPADM

## 2024-09-15 RX ORDER — PROCHLORPERAZINE EDISYLATE 5 MG/ML
5-10 INJECTION INTRAMUSCULAR; INTRAVENOUS EVERY 4 HOURS PRN
Status: DISCONTINUED | OUTPATIENT
Start: 2024-09-15 | End: 2024-09-17 | Stop reason: HOSPADM

## 2024-09-15 RX ORDER — FAMOTIDINE 20 MG/1
20 TABLET, FILM COATED ORAL
Status: ON HOLD | COMMUNITY
End: 2024-09-17

## 2024-09-15 RX ORDER — CALCIUM CARBONATE 500 MG/1
500 TABLET, CHEWABLE ORAL PRN
COMMUNITY

## 2024-09-15 RX ORDER — OXYCODONE HYDROCHLORIDE 5 MG/1
5 TABLET ORAL
Status: DISCONTINUED | OUTPATIENT
Start: 2024-09-15 | End: 2024-09-17 | Stop reason: HOSPADM

## 2024-09-15 RX ORDER — SODIUM CHLORIDE 9 MG/ML
INJECTION, SOLUTION INTRAVENOUS CONTINUOUS
Status: DISCONTINUED | OUTPATIENT
Start: 2024-09-15 | End: 2024-09-17 | Stop reason: HOSPADM

## 2024-09-15 RX ORDER — POLYETHYLENE GLYCOL 3350 17 G/17G
1 POWDER, FOR SOLUTION ORAL
Status: DISCONTINUED | OUTPATIENT
Start: 2024-09-15 | End: 2024-09-17 | Stop reason: HOSPADM

## 2024-09-15 RX ORDER — PROMETHAZINE HYDROCHLORIDE 25 MG/1
12.5-25 SUPPOSITORY RECTAL EVERY 4 HOURS PRN
Status: DISCONTINUED | OUTPATIENT
Start: 2024-09-15 | End: 2024-09-17 | Stop reason: HOSPADM

## 2024-09-15 RX ADMIN — SODIUM CHLORIDE: 9 INJECTION, SOLUTION INTRAVENOUS at 06:26

## 2024-09-15 RX ADMIN — PANTOPRAZOLE SODIUM 80 MG: 40 INJECTION, POWDER, FOR SOLUTION INTRAVENOUS at 04:42

## 2024-09-15 RX ADMIN — ONDANSETRON 4 MG: 2 INJECTION INTRAMUSCULAR; INTRAVENOUS at 11:25

## 2024-09-15 RX ADMIN — OXYCODONE HYDROCHLORIDE 5 MG: 5 TABLET ORAL at 15:15

## 2024-09-15 RX ADMIN — SUCRALFATE 1 G: 1 TABLET ORAL at 17:24

## 2024-09-15 RX ADMIN — POTASSIUM CHLORIDE 20 MEQ: 1500 TABLET, EXTENDED RELEASE ORAL at 08:37

## 2024-09-15 RX ADMIN — HYDROMORPHONE HYDROCHLORIDE 1 MG: 1 INJECTION, SOLUTION INTRAMUSCULAR; INTRAVENOUS; SUBCUTANEOUS at 03:51

## 2024-09-15 RX ADMIN — ACETAMINOPHEN 650 MG: 325 TABLET ORAL at 15:17

## 2024-09-15 RX ADMIN — ONDANSETRON 4 MG: 2 INJECTION INTRAMUSCULAR; INTRAVENOUS at 03:51

## 2024-09-15 RX ADMIN — SUCRALFATE 1 G: 1 TABLET ORAL at 11:28

## 2024-09-15 RX ADMIN — PANTOPRAZOLE SODIUM 40 MG: 40 INJECTION, POWDER, FOR SOLUTION INTRAVENOUS at 17:24

## 2024-09-15 RX ADMIN — OXYCODONE HYDROCHLORIDE 5 MG: 5 TABLET ORAL at 07:11

## 2024-09-15 RX ADMIN — OXYCODONE HYDROCHLORIDE 5 MG: 5 TABLET ORAL at 11:28

## 2024-09-15 RX ADMIN — IOHEXOL 80 ML: 350 INJECTION, SOLUTION INTRAVENOUS at 04:45

## 2024-09-15 RX ADMIN — SUCRALFATE 1 G: 1 TABLET ORAL at 06:25

## 2024-09-15 RX ADMIN — SODIUM CHLORIDE: 9 INJECTION, SOLUTION INTRAVENOUS at 23:26

## 2024-09-15 RX ADMIN — SUCRALFATE 1 G: 1 TABLET ORAL at 23:22

## 2024-09-15 RX ADMIN — LIDOCAINE HYDROCHLORIDE 30 ML: 20 SOLUTION ORAL; TOPICAL at 03:38

## 2024-09-15 RX ADMIN — OXYCODONE HYDROCHLORIDE 5 MG: 5 TABLET ORAL at 23:21

## 2024-09-15 RX ADMIN — SENNOSIDES AND DOCUSATE SODIUM 2 TABLET: 50; 8.6 TABLET ORAL at 17:24

## 2024-09-15 RX ADMIN — OXYCODONE HYDROCHLORIDE 5 MG: 5 TABLET ORAL at 19:05

## 2024-09-15 SDOH — ECONOMIC STABILITY: TRANSPORTATION INSECURITY
IN THE PAST 12 MONTHS, HAS LACK OF RELIABLE TRANSPORTATION KEPT YOU FROM MEDICAL APPOINTMENTS, MEETINGS, WORK OR FROM GETTING THINGS NEEDED FOR DAILY LIVING?: NO

## 2024-09-15 SDOH — ECONOMIC STABILITY: TRANSPORTATION INSECURITY
IN THE PAST 12 MONTHS, HAS THE LACK OF TRANSPORTATION KEPT YOU FROM MEDICAL APPOINTMENTS OR FROM GETTING MEDICATIONS?: NO

## 2024-09-15 ASSESSMENT — ENCOUNTER SYMPTOMS
FEVER: 0
HEMOPTYSIS: 0
FOCAL WEAKNESS: 0
HALLUCINATIONS: 0
FLANK PAIN: 0
CHILLS: 0
SPUTUM PRODUCTION: 0
NERVOUS/ANXIOUS: 0
VOMITING: 0
BACK PAIN: 0
NAUSEA: 1
POLYDIPSIA: 0
WEIGHT LOSS: 0
BLURRED VISION: 0
BLOOD IN STOOL: 0
DOUBLE VISION: 0
COUGH: 0
DIARRHEA: 0
SPEECH CHANGE: 0
PHOTOPHOBIA: 0
BRUISES/BLEEDS EASILY: 0
ABDOMINAL PAIN: 1
NECK PAIN: 0
TREMORS: 0
PALPITATIONS: 0
HEARTBURN: 0
ORTHOPNEA: 0
CONSTIPATION: 0
HEADACHES: 0

## 2024-09-15 ASSESSMENT — PAIN DESCRIPTION - PAIN TYPE
TYPE: ACUTE PAIN

## 2024-09-15 ASSESSMENT — LIFESTYLE VARIABLES
AVERAGE NUMBER OF DAYS PER WEEK YOU HAVE A DRINK CONTAINING ALCOHOL: 2
EVER HAD A DRINK FIRST THING IN THE MORNING TO STEADY YOUR NERVES TO GET RID OF A HANGOVER: NO
HAVE PEOPLE ANNOYED YOU BY CRITICIZING YOUR DRINKING: NO
CONSUMPTION TOTAL: NEGATIVE
TOTAL SCORE: 0
TOTAL SCORE: 0
HAVE YOU EVER FELT YOU SHOULD CUT DOWN ON YOUR DRINKING: NO
EVER FELT BAD OR GUILTY ABOUT YOUR DRINKING: NO
ON A TYPICAL DAY WHEN YOU DRINK ALCOHOL HOW MANY DRINKS DO YOU HAVE: 1
TOTAL SCORE: 0
HOW MANY TIMES IN THE PAST YEAR HAVE YOU HAD 5 OR MORE DRINKS IN A DAY: 0
ALCOHOL_USE: YES
DOES PATIENT WANT TO STOP DRINKING: NO
SUBSTANCE_ABUSE: 0

## 2024-09-15 ASSESSMENT — COGNITIVE AND FUNCTIONAL STATUS - GENERAL
MOBILITY SCORE: 24
SUGGESTED CMS G CODE MODIFIER DAILY ACTIVITY: CH
DAILY ACTIVITIY SCORE: 24
SUGGESTED CMS G CODE MODIFIER MOBILITY: CH

## 2024-09-15 ASSESSMENT — SOCIAL DETERMINANTS OF HEALTH (SDOH)
IN THE PAST 12 MONTHS, HAS THE ELECTRIC, GAS, OIL, OR WATER COMPANY THREATENED TO SHUT OFF SERVICE IN YOUR HOME?: NO
WITHIN THE LAST YEAR, HAVE YOU BEEN HUMILIATED OR EMOTIONALLY ABUSED IN OTHER WAYS BY YOUR PARTNER OR EX-PARTNER?: NO
WITHIN THE LAST YEAR, HAVE YOU BEEN AFRAID OF YOUR PARTNER OR EX-PARTNER?: NO
WITHIN THE PAST 12 MONTHS, YOU WORRIED THAT YOUR FOOD WOULD RUN OUT BEFORE YOU GOT THE MONEY TO BUY MORE: NEVER TRUE
WITHIN THE LAST YEAR, HAVE TO BEEN RAPED OR FORCED TO HAVE ANY KIND OF SEXUAL ACTIVITY BY YOUR PARTNER OR EX-PARTNER?: NO
WITHIN THE PAST 12 MONTHS, THE FOOD YOU BOUGHT JUST DIDN'T LAST AND YOU DIDN'T HAVE MONEY TO GET MORE: NEVER TRUE
WITHIN THE LAST YEAR, HAVE YOU BEEN KICKED, HIT, SLAPPED, OR OTHERWISE PHYSICALLY HURT BY YOUR PARTNER OR EX-PARTNER?: NO

## 2024-09-15 ASSESSMENT — PATIENT HEALTH QUESTIONNAIRE - PHQ9
2. FEELING DOWN, DEPRESSED, IRRITABLE, OR HOPELESS: NOT AT ALL
1. LITTLE INTEREST OR PLEASURE IN DOING THINGS: NOT AT ALL
SUM OF ALL RESPONSES TO PHQ9 QUESTIONS 1 AND 2: 0

## 2024-09-15 ASSESSMENT — FIBROSIS 4 INDEX: FIB4 SCORE: 0.97

## 2024-09-15 NOTE — PROGRESS NOTES
Report received from ER nurse, pt transported to room. Vitals stable, pt states pain 6/10 and declines intervention currently. Pt resting with call light and water at bedside. States no further questions.

## 2024-09-15 NOTE — CONSULTS
Date of Consultation:  9/15/2024    Patient: : Dorothy Nova  MRN: [id number]    Referring Physician:  Dr. Cortez Conner MD>      GI:Matias Simon M.D.     Reason for Consultation: Epigastric pain, possible acute cholecystitis, possible GI bleeding.    History of Present Illness:   The patient 49 years old female with medical history of removal intestinal obstruction, upper GI scope x 1, also colonoscopy for screening, presented to inpatient service for epigastric pain, nausea, possible GI bleeding, ultrasound showed possible acute cholecystitis.    GI team saw the patient early morning.The patient just had a small amount of breakfast.    Mild discomfort from epigastric area, no evidence of acute abdomen.  The patient reports severe tenderness from the ultrasound probe in the morning.  We discussed the finding of ultrasound with patient, some fluid outside the gallbladder, some gallbladder wall thickening with possible stone inside and the distended gallbladder.  Further GI bleeding, so far patient hemoglobin stable around 14, possible dark stool recently but no evidence of hematemesis or hematochezia.  No bowel movement so far today.          No past medical history on file.      Past Surgical History:   Procedure Laterality Date    PB EXCIS TENDON SHEATH LESION, HAND/FINGER Right 8/22/2023    Procedure: RIGHT INDEX FINGER CYST EXCISION;  Surgeon: Debbie Cantu M.D.;  Location: [facility];  Service: Orthopedics    PB EXCIS PBIMARY GANGLION WRIST Right 6/9/2022    Procedure: RIGHT INDEX FINGER CYST EXCISION;  Surgeon: Debbie Cantu M.D.;  Location: [facility];  Service: Orthopedics    ABDOMINAL HYSTERECTOMY TOTAL      CERVICAL LAURA DISC      NERVE ULNAR TRANSFER Right        History reviewed. No pertinent family history.    Social History     Socioeconomic History    Marital status:    Tobacco Use    Smoking status: Never    Smokeless tobacco: Never   Vaping Use     Vaping status: Never Used   Substance and Sexual Activity    Alcohol use: Yes     Comment: 1 glass wine per week    Drug use: No     Social Determinants of Health     Food Insecurity: No Food Insecurity (9/15/2024)    Hunger Vital Sign     Worried About Running Out of Food in the Last Year: Never true     Ran Out of Food in the Last Year: Never true   Transportation Needs: No Transportation Needs (9/15/2024)    PRAPARE - Transportation     Lack of Transportation (Medical): No     Lack of Transportation (Non-Medical): No   Intimate Partner Violence: Not At Risk (9/15/2024)    Humiliation, Afraid, Rape, and Kick questionnaire     Fear of Current or Ex-Partner: No     Emotionally Abused: No     Physically Abused: No     Sexually Abused: No   Housing Stability: Low Risk  (9/15/2024)    Housing Stability Vital Sign     Unable to Pay for Housing in the Last Year: No     Number of Times Moved in the Last Year: 0     Homeless in the Last Year: No           Physical Exam:  Vitals:    09/15/24 0503 09/15/24 0600 09/15/24 0721 09/15/24 0855   BP: 102/51 112/66 95/60    Pulse: 75 94 69    Resp: 19 16 15    Temp:  36.2 °C (97.1 °F) 36.2 °C (97.2 °F)    TempSrc:  Temporal Temporal    SpO2: 97% 100% 100%    Weight:    51.7 kg (114 lb)   Height:           Constitutional: No fevers.  Eyes: No symptoms reported.   Ears/Nose/Throat/Mouth: No choking reported.  Cardiovascular: No chest pain reported.   Respiratory: Denies shortness of breath.  Gastrointestinal/Hepatic: Per H/P.   Skin/Breast: No new rash reported.   Psychiatric: No complaints    HEENT: grossly normal.  Cardiovascular: Please refer to primary team's daily assessment.   Lungs: Please refer to primary team's daily assessment.   Abdomen: Deferred, the patient has breakfast tray on her lap.   Skin: No erythema, No rash.  Neurologic: Alert & oriented x 3, Normal motor function, No focal deficits noted.  PSY: stable mood.         Labs:  Recent Labs     09/15/24  0341   WBC  15.9*   RBC 4.55   HEMOGLOBIN 14.6   HEMATOCRIT 41.6   MCV 91.4   MCH 32.1   MCHC 35.1   RDW 41.2   PLATELETCT 337   MPV 9.4     Recent Labs     09/15/24  0341   SODIUM 136   POTASSIUM 3.8   CHLORIDE 99   CO2 23   GLUCOSE 121*   BUN 13           Recent Labs     09/15/24  0341   ASTSGOT 25   ALTSGPT 13   TBILIRUBIN 0.7   ALKPHOSPHAT 55   GLOBULIN 2.8         Imaging:  US-RUQ  Narrative: 9/15/2024 4:48 AM    HISTORY/REASON FOR EXAM:  Abdominal pain    TECHNIQUE/EXAM DESCRIPTION AND NUMBER OF VIEWS:  Real-time sonography of the liver and biliary tree.    COMPARISON: None    FINDINGS:  The liver is normal in contour. There is no evidence of solid mass lesion. The liver measures 16.16 cm.    The gallbladder is normal. There is an echogenic focus in the gallbladder with acoustic shadowing.  The gallbladder wall thickness measures 3.30 mm. There is minimal pericholecystic fluid.  The common duct measures 4.20 mm.    The visualized pancreas is unremarkable.  The visualized aorta is normal in caliber.    Intrahepatic IVC is patent.    The portal vein is patent with hepatopetal flow. The MPV measures 0.84 cm.    The right kidney measures 10.89 cm. Minimal pelviectasis.    There is no ascites.  Impression: 1.  Hepatomegaly.    2.  Cholelithiasis with evidence of acute cholecystitis.    3.  Minimal pelviectasis of the right kidney.  CT-ABDOMEN-PELVIS WITH  Narrative: 9/15/2024 4:11 AM    HISTORY/REASON FOR EXAM:  Pain    TECHNIQUE/EXAM DESCRIPTION:   CT scan of the abdomen and pelvis with contrast.    Contrast-enhanced helical scanning was obtained from the diaphragmatic domes through the pubic symphysis following the bolus administration of nonionic contrast without complication.    80 mL of Omnipaque 350 nonionic contrast was administered without complication.    Low dose optimization technique was utilized for this CT exam including automated exposure control and adjustment of the mA and/or kV according to patient  size.    COMPARISON: No prior studies available.    FINDINGS:  Lower Chest: Unremarkable.    Liver: Normal.    Spleen: Unremarkable.    Pancreas: Unremarkable.    Gallbladder: Markedly distended    Biliary: Nondilated.    Adrenal glands: Normal.    Kidneys: Unremarkable without hydronephrosis.    Bowel: No obstruction or acute inflammation. Moderate amount of stool throughout the colon.    Lymph nodes: No adenopathy.    Vasculature: Unremarkable.    Peritoneum: Unremarkable without ascites.    Musculoskeletal: No acute or destructive process.    Pelvis: No adenopathy or free fluid.  Impression: 1.  Gallbladder hydrops..    2.  Mild constipation            Impressions:  The patient 49 years old female with medical history of removal intestinal obstruction, upper GI scope x 1, also colonoscopy for screening, presented to inpatient service for epigastric pain, nausea, possible GI bleeding, ultrasound showed possible acute cholecystitis.    GI team saw the patient early morning.The patient just had a small amount of breakfast.    GI team agrees with possible peptic disease with mild GI bleeding, GI team will suggest PPI twice daily dose, and we will hold off on upper GI scope due to no evidence of brisk bleeding and stable hemoglobin until acute cholecystitis is addressed by the surgery teammate.    GI team will continue to follow.  Please n.p.o. the patient midnight either for surgery or GI procedure tomorrow.      This note was generated using voice recognition software which has a small chance of producing errors of grammar and possibly content. I have made every reasonable attempt to find and correct any obvious errors, but expect that some may not be found prior to finalization of this note.

## 2024-09-15 NOTE — H&P
"Hospital Medicine History & Physical Note    Date of Service  9/15/2024    Primary Care Physician  Lavonne Weller M.D.        Code Status  Full Code    Chief Complaint  Epigastric abdominal pain, nausea      Chief Complaint   Patient presents with    Abdominal Pain     Pt presents with epigastric pain radiating to mid back. Pt describes pain as \"tearing.\" Reports taking Pepto, Tums, and Pepcid which made pain worse.        History of Presenting Illness  Dorothy Nova is a 49 y.o. female with past medical history of  hypothyroidism, who presented 9/15/2024 with complaints of epigastric abdominal pain severe, nausea, for 1 day.  In ER she had rectal exam that showed melena, guaiac positive..  CT of the abdomen and pelvis with contrast showed gallbladder hydrops, mild constipation.  Right upper quadrant ultrasound is pending.  ERP Dr. Jacome consulted with Dr. Barkley/surgery who will follow with the patient for gallbladder hydrops.  Patient denies taking aspirin or NSAID.  She will be admitted to the hospital for GI bleed.  Of note, in ER she desaturated to 87%, was placed on 2 L nasal cannula.  She received GI cocktail, Dilaudid 1 mg, Zofran, pantoprazole 80 mg IV    I discussed the plan of care with patient, bedside RN, and ERP .    Review of Systems  Review of Systems   Constitutional:  Negative for chills, fever and weight loss.   HENT:  Negative for ear pain, hearing loss and tinnitus.    Eyes:  Negative for blurred vision, double vision and photophobia.   Respiratory:  Negative for cough, hemoptysis and sputum production.    Cardiovascular:  Negative for chest pain, palpitations and orthopnea.   Gastrointestinal:  Positive for abdominal pain, melena and nausea. Negative for blood in stool, constipation, diarrhea, heartburn and vomiting.   Genitourinary:  Negative for dysuria, flank pain, frequency and hematuria.   Musculoskeletal:  Negative for back pain, joint pain and neck pain.   Skin:  Negative for " itching and rash.   Neurological:  Negative for tremors, speech change, focal weakness and headaches.   Endo/Heme/Allergies:  Negative for environmental allergies and polydipsia. Does not bruise/bleed easily.   Psychiatric/Behavioral:  Negative for hallucinations and substance abuse. The patient is not nervous/anxious.        Past Medical History   has no past medical history on file.    Surgical History   has a past surgical history that includes abdominal hysterectomy total; nerve ulnar transfer (Right); cervical gogo disc; pr excis primary ganglion wrist (Right, 6/9/2022); and pr excis tendon sheath lesion, hand/finger (Right, 8/22/2023).     Family History  family history is not on file.   Family history reviewed with patient. There is no family history that is pertinent to the chief complaint.     Social History   reports that she has never smoked. She has never used smokeless tobacco. She reports current alcohol use. She reports that she does not use drugs.    Allergies  Allergies   Allergen Reactions    Skin Adhesives Hives, Itching, Rash and Swelling       Medications  Prior to Admission Medications   Prescriptions Last Dose Informant Patient Reported? Taking?   ADDERALL XR, 20MG, 20 MG per XR capsule   Yes No   Sig: TAKE 1 CAPSULE BY MOUTH EVERY DAY IN THE MORNING F90   Vortioxetine HBr (TRINTELLIX) 10 MG Tab   Yes No   Sig: Take  by mouth.   Vortioxetine HBr (TRINTELLIX) 10 MG Tab   Yes No   amphetamine-dextroamphetamine (ADDERALL) 5 MG Tab   Yes No   Sig: TAKE 1 TABLET BY MOUTH IN THE AFTERNOON AS NEEDED FOR ADHD SYMPTOM CONTROL. F90   cephALEXin (KEFLEX) 500 MG Cap   Yes No   Sig: TAKE 1 CAPSULE BY MOUTH THREE TIMES DAILY FOR 7 DAYS   liothyronine (CYTOMEL) 5 MCG Tab   Yes No   Sig: TAKE 1 TO 2 TABLETS BY MOUTH EVERY DAY IN THE MORNING      Facility-Administered Medications: None       Physical Exam  Temp:  [36.7 °C (98.1 °F)] 36.7 °C (98.1 °F)  Pulse:  [72-91] 75  Resp:  [16-19] 19  BP:  "(102-121)/(51-96) 102/51  SpO2:  [87 %-100 %] 97 %  Blood Pressure: 102/51   Temperature: 36.7 °C (98.1 °F)   Pulse: 75   Respiration: 19   Pulse Oximetry: 97 %       Physical Exam  Vitals and nursing note reviewed.   Constitutional:       General: She is not in acute distress.     Appearance: Normal appearance.   HENT:      Head: Normocephalic and atraumatic.      Nose: Nose normal.      Mouth/Throat:      Mouth: Mucous membranes are moist.   Eyes:      Extraocular Movements: Extraocular movements intact.      Pupils: Pupils are equal, round, and reactive to light.   Cardiovascular:      Rate and Rhythm: Normal rate and regular rhythm.   Pulmonary:      Effort: Pulmonary effort is normal.      Breath sounds: Normal breath sounds.   Abdominal:      General: Abdomen is flat. There is no distension.      Tenderness: There is abdominal tenderness in the epigastric area. There is no guarding or rebound.   Musculoskeletal:         General: No swelling or deformity. Normal range of motion.      Cervical back: Normal range of motion and neck supple.   Skin:     General: Skin is warm and dry.   Neurological:      General: No focal deficit present.      Mental Status: She is alert and oriented to person, place, and time.   Psychiatric:         Mood and Affect: Mood normal.         Behavior: Behavior normal.         Laboratory:  Recent Labs     09/15/24  0341   WBC 15.9*   RBC 4.55   HEMOGLOBIN 14.6   HEMATOCRIT 41.6   MCV 91.4   MCH 32.1   MCHC 35.1   RDW 41.2   PLATELETCT 337   MPV 9.4     Recent Labs     09/15/24  0341   SODIUM 136   POTASSIUM 3.8   CHLORIDE 99   CO2 23   GLUCOSE 121*   BUN 13   CREATININE 0.77   CALCIUM 9.5     Recent Labs     09/15/24  0341   ALTSGPT 13   ASTSGOT 25   ALKPHOSPHAT 55   TBILIRUBIN 0.7   LIPASE 24   GLUCOSE 121*         No results for input(s): \"NTPROBNP\" in the last 72 hours.      Recent Labs     09/15/24  0341   TROPONINT <6       Imaging:  CT-ABDOMEN-PELVIS WITH   Final Result         1. "  Gallbladder hydrops..      2.  Mild constipation      US-RUQ    (Results Pending)       Assessment/Plan:  Justification for Admission Status  I anticipate this patient will require at least two midnights for appropriate medical management, necessitating inpatient admission because GI bleed    Patient will need a Med/Surg bed on MEDICAL service .  The need is secondary to GI bleed.    * GI bleed- (present on admission)  Assessment & Plan  49-year-old female  presented with severe epigastric pain, nausea.  CT of the abdomen negative for gastric perforation  On rectal exam patient has melena, guaiac positive  Plan: IV Protonix 20 mg twice daily, sucralfate  Nausea as needed  IV fluid support.  Trend H&H  Await aspirin NSAID or anticoagulation.  GI consult in AM.    Gallbladder hydrops  Assessment & Plan  CT of the abdomen showed gallbladder hydrops.  Pending right upper quadrant ultrasound  Dr. Banks/ERP consulted with Dr. Barkley      Hypoxia  Assessment & Plan  Desaturated to 87%, on 2 L nasal cannula now.  Question secondary to IV Dilaudid  Will check chest x-ray.  Pulse ox, wean off oxygen as tolerated.    Leukocytosis  Assessment & Plan  WBC 15.9  Probably reactive.  Will check chest x-ray, UA  Monitor.        VTE prophylaxis: SCDs/TEDs

## 2024-09-15 NOTE — PROGRESS NOTES
"Hospital Medicine Daily Progress Note    Date of Service  9/15/2024    Chief Complaint  Dorothy Nova is a 49 y.o. female admitted 9/15/2024 with   Chief Complaint   Patient presents with    Abdominal Pain     Pt presents with epigastric pain radiating to mid back. Pt describes pain as \"tearing.\" Reports taking Pepto, Tums, and Pepcid which made pain worse.        Hospital Course  No notes on file    Interval Problem Update  Patient was seen and examined at bedside.  I have personally reviewed and interpreted vitals, labs, and imaging.    9/15.  Afebrile.  Hypotensive this morning.  On room air.  Replete potassium.  Denies fever, chills or chest pains, shortness of breath.  Abdominal pain is improved.  Had some bad nausea and vomiting last night.  No further bowel movements today.  Already had breakfast today.  Case was discussed with GI and general surgery.  N.p.o. after midnight.  Wbc 15.9  Hgb 14.6 > 12.2    I have discussed this patient's plan of care and discharge plan at IDT rounds today with Case Management, Nursing, Nursing leadership, and other members of the IDT team.    Consultants/Specialty  general surgery and GI    Code Status  Full Code    Disposition  The patient is not medically cleared for discharge to home or a post-acute facility.  Anticipate discharge to: home with close outpatient follow-up    I have placed the appropriate orders for post-discharge needs.    Review of Systems  ROS     Physical Exam  Temp:  [36.2 °C (97.1 °F)-36.7 °C (98.1 °F)] 36.2 °C (97.2 °F)  Pulse:  [69-94] 69  Resp:  [15-19] 15  BP: ()/(51-96) 95/60  SpO2:  [87 %-100 %] 100 %    Physical Exam    Fluids  No intake or output data in the 24 hours ending 09/15/24 0753     Laboratory  Recent Labs     09/15/24  0341   WBC 15.9*   RBC 4.55   HEMOGLOBIN 14.6   HEMATOCRIT 41.6   MCV 91.4   MCH 32.1   MCHC 35.1   RDW 41.2   PLATELETCT 337   MPV 9.4     Recent Labs     09/15/24  0341   SODIUM 136   POTASSIUM 3.8   CHLORIDE " 99   CO2 23   GLUCOSE 121*   BUN 13   CREATININE 0.77   CALCIUM 9.5                   Imaging  US-RUQ   Final Result      1.  Hepatomegaly.      2.  Cholelithiasis with evidence of acute cholecystitis.      3.  Minimal pelviectasis of the right kidney.      CT-ABDOMEN-PELVIS WITH   Final Result         1.  Gallbladder hydrops..      2.  Mild constipation           Assessment/Plan  * GI bleed- (present on admission)  Assessment & Plan  9/15/2024  49-year-old female  presented with severe epigastric pain, nausea.  CT of the abdomen negative for gastric perforation  On rectal exam patient has melena, guaiac positive  Plan: IV Protonix 20 mg twice daily, sucralfate  Nausea as needed  IV fluid support.  Trend H&H  Await aspirin NSAID or anticoagulation.  GI consult in AM.    Gallbladder hydrops  Assessment & Plan  9/15/2024  CT of the abdomen showed gallbladder hydrops.  Pending right upper quadrant ultrasound  Dr. Banks/ERP consulted with Dr. Barkley    Hypoxia  Assessment & Plan  9/15/2024  Desaturated to 87%, on 2 L nasal cannula now.  Question secondary to IV Dilaudid  Will check chest x-ray.  Pulse ox, wean off oxygen as tolerated.    Leukocytosis  Assessment & Plan  9/15/2024  WBC 15.9  Probably reactive.  Will check chest x-ray, UA  Monitor.         VTE prophylaxis: VTE Selection    I have performed a physical exam and reviewed and updated ROS and Plan today (9/15/2024). In review of yesterday's note (9/14/2024), there are no changes except as documented above.

## 2024-09-15 NOTE — PROGRESS NOTES
4 Eyes Skin Assessment Completed by ZENON Kumar and ZENON Lainez.    Head WDL  Ears WDL  Nose WDL  Mouth WDL  Neck WDL  Breast/Chest WDL  Shoulder Blades WDL  Spine WDL  (R) Arm/Elbow/Hand WDL  (L) Arm/Elbow/Hand WDL  Abdomen WDL  Groin WDL  Scrotum/Coccyx/Buttocks WDL  (R) Leg WDL  (L) Leg WDL  (R) Heel/Foot/Toe WDL  (L) Heel/Foot/Toe WDL          Devices In Places IV      Interventions In Place Pillows    Possible Skin Injury No    Pictures Uploaded Into Epic N/A  Wound Consult Placed N/A  RN Wound Prevention Protocol Ordered No

## 2024-09-15 NOTE — ASSESSMENT & PLAN NOTE
9/16/2024  Desaturated to 87%, on 2 L nasal cannula now.  Question secondary to IV Dilaudid  Will check chest x-ray.  Pulse ox, wean off oxygen as tolerated.

## 2024-09-15 NOTE — CONSULTS
.    CHIEF COMPLAINT: Midepigastric pain and GI bleeding    HISTORY OF PRESENT ILLNESS: The patient is a 49 year-old  woman who presents to the Emergency Department with midepigastric pain.  She was found to have evidence of GI bleeding and workup also did reveal evidence of cholecystitis possibly acute.  She is being worked up for possible ulcer and I have been asked to evaluate her further for her gallbladder.  She describes midepigastric pain associated with some nausea.  She is also had some emesis.  She has not had any hematemesis.  She has not had any jaundice or acholic stools.    PAST MEDICAL HISTORY:  has no past medical history of Asthma or Diabetes (HCC).    PAST SURGICAL HISTORY:  has a past surgical history that includes abdominal hysterectomy total; nerve ulnar transfer (Right); cervical gogo disc; pr excis primary ganglion wrist (Right, 6/9/2022); and pr excis tendon sheath lesion, hand/finger (Right, 8/22/2023).    ALLERGIES:   Allergies   Allergen Reactions    Skin Adhesives Hives, Rash, Itching and Swelling             CURRENT MEDICATIONS:    Home Medications       Reviewed by Gabriella Medeiros (Pharmacy Tech) on 09/15/24 at 0722  Med List Status: Complete     Medication Last Dose Status   amphetamine-dextroamphetamine (ADDERALL) 5 MG Tab 9/14/2024 Active   amphetamine-dextroamphetamine XR (ADDERALL XR) 10 MG CAPSULE SR 24 HR 9/13/2024 Active   bismuth subsalicylate (PEPTO-BISMOL) 262 MG/15ML Suspension 9/14/2024 Active   calcium carbonate (TUMS) 500 MG Chew Tab 9/14/2024 Active   famotidine (PEPCID) 20 MG Tab 9/15/2024 Active                  Audit from Redirected Encounters    **Home medications have not yet been reviewed for this encounter**         FAMILY HISTORY: family history is not on file.    SOCIAL HISTORY:  reports that she has never smoked. She has never used smokeless tobacco. She reports current alcohol use. She reports that she does not use drugs.    REVIEW OF SYSTEMS:  "Comprehensive review of systems is negative with the exception of the aforementioned HPI, PMH, and PSH bullets in accordance with CMS guidelines.    PHYSICAL EXAMINATION:      Constitutional:     Vital Signs: BP 95/60   Pulse 69   Temp 36.2 °C (97.2 °F) (Temporal)   Resp 15   Ht 1.651 m (5' 5\")   Wt 51.7 kg (114 lb)   SpO2 100%    General Appearance: appears stated age, is in no apparent distress.  HEENT: The pupils are equal, round, and reactive to light bilaterally. The extraocular muscles are intact bilaterally. The sclera are non icteric. Nares and oropharynx are clear.   Neck: Supple. No adenopathy.  Respiratory:   Inspection: Unlabored respirations, no intercostal retractions, paradoxical motion, or accessory muscle use.   Auscultation: normal.  Cardiovascular:   Inspection: The skin is warm.  Auscultation: Regular rate and rhythm.   Peripheral Pulses: Normal.   Abdomen:  Inspection: Abdominal inspection reveals  no incisions .   Palpation: Palpation is remarkable for mild tenderness in the right upper quadrant  region. No abdominal wall hernias.  Extremities:   Examination of the upper and lower extremities demonstrates no cyanosis edema or clubbing.  Neurologic:   Alert & oriented to person, time and place. Normal motor function. Normal sensory function. No focal deficits noted.    LABORATORY VALUES:   Recent Labs     09/15/24  0341   WBC 15.9*   RBC 4.55   HEMOGLOBIN 14.6   HEMATOCRIT 41.6   MCV 91.4   MCH 32.1   MCHC 35.1   RDW 41.2   PLATELETCT 337   MPV 9.4     Recent Labs     09/15/24  0341   SODIUM 136   POTASSIUM 3.8   CHLORIDE 99   CO2 23   GLUCOSE 121*   BUN 13   CREATININE 0.77   CALCIUM 9.5     Recent Labs     09/15/24  0341   ASTSGOT 25   ALTSGPT 13   TBILIRUBIN 0.7   ALKPHOSPHAT 55   GLOBULIN 2.8            IMAGING:   US-RUQ   Final Result      1.  Hepatomegaly.      2.  Cholelithiasis with evidence of acute cholecystitis.      3.  Minimal pelviectasis of the right kidney.    "   CT-ABDOMEN-PELVIS WITH   Final Result         1.  Gallbladder hydrops..      2.  Mild constipation      DX-CHEST-LIMITED (1 VIEW)    (Results Pending)       ASSESSMENT AND PLAN:   49-year-old woman who presents with evidence of GI bleeding as well as evidence of cholecystitis.  A cholecystectomy is indicated.  She was given breakfast this morning and is not a candidate for surgery this morning.  Her workup for her GI bleed is also in progress.  Anticipate that she will be a candidate for a cholecystectomy in the next 1 to 2 days.  Discussed in detail with the patient and she understands.  ACS Micky will follow along.      DISPOSITION: Medical evaluation and admission. The patient was admitted to the Medical Service prior to surgical consultation. [facility] Weeks Service will follow.     ____________________________________     Ramon Barkley M.D.    DD: 9/15/2024  9:34 AM    [place] Guidelines for Acute Cholecystitis 2018  AAST Grading System for EGS Conditions  ACS NSQIP Surgical Risk Calculator

## 2024-09-15 NOTE — ASSESSMENT & PLAN NOTE
9/16/2024  CT of the abdomen showed gallbladder hydrops.  P   Right upper quadrant ultrasound showed Cholelithiasis with evidence of acute cholecystitis.    Dr. Barkley/surgery was consulted.      S.p laparoscopic cholecystectomy 9/17

## 2024-09-15 NOTE — ASSESSMENT & PLAN NOTE
9/16/2024  49-year-old female  presented with severe epigastric pain, nausea.  CT of the abdomen negative for gastric perforation  On rectal exam patient has melena, guaiac positive  Plan: IV Protonix 20 mg twice daily, sucralfate  Nausea as needed  IV fluid support.  Trend H&H  Await aspirin NSAID or anticoagulation.  GI is following

## 2024-09-15 NOTE — ED TRIAGE NOTES
"Chief Complaint   Patient presents with    Abdominal Pain     Pt presents with epigastric pain radiating to mid back. Pt describes pain as \"tearing.\" Reports taking Pepto, Tums, and Pepcid which made pain worse.        /65   Pulse 86   Temp 36.7 °C (98.1 °F) (Temporal)   Resp 17   Ht 1.651 m (5' 5\")   Wt 52 kg (114 lb 10.2 oz)   SpO2 99%   BMI 19.08 kg/m²     Charge RN called, pt roomed to red 07      "

## 2024-09-15 NOTE — ED NOTES
Med rec complete per patient  Allergies reviewed.   Outpatient antibiotics in the last 30 days? No   Anticoagulants taken in the last 14 days? No     Pharmacy patient utilizes: [facility] on [address] and [address]f [facility]

## 2024-09-15 NOTE — ED PROVIDER NOTES
"ED Provider Note    Scribed for Ayden Banks by Colleen Swain. 9/15/2024  3:40 AM    Primary care provider: Lavonne Weller M.D.  Means of arrival: Private vehicle  History obtained from: Patient  History limited by: None    CHIEF COMPLAINT  Chief Complaint   Patient presents with    Abdominal Pain     Pt presents with epigastric pain radiating to mid back. Pt describes pain as \"tearing.\" Reports taking Pepto, Tums, and Pepcid which made pain worse.      EXTERNAL RECORDS REVIEWED  Outpatient Notes The patient was seen in November of 2023 at [facility] for left foot pain.     HPI/ROS  LIMITATION TO HISTORY   Select: : None  OUTSIDE HISTORIAN(S):  None.    HPI  Dorothy Nova is a 49 y.o. female who presents to the Emergency Department for evaluation of epigastric pain onset 1 day ago. She describes that her pain was originally more mid chest pain and notes that it then became epigastric pain yesterday. The patient reports the pain as tearing and states that it feels like she has an ulcer. The patient states she also has nausea, and blood in her stool, but denies any lightheadedness or dizziness. She has taken Pepto Bismol, Tums and Pepcid, but notes that this made the pain worse. The patient denies any chance of pregnancy.    REVIEW OF SYSTEMS  As above, all other systems reviewed and are negative.   See HPI for further details.     PAST MEDICAL HISTORY   No past medical history noted.     SURGICAL HISTORY   has a past surgical history that includes abdominal hysterectomy total; nerve ulnar transfer (Right); cervical gogo disc; excis primary ganglion wrist (Right, 6/9/2022); and excis tendon sheath lesion, hand/finger (Right, 8/22/2023).    SOCIAL HISTORY  Social History     Tobacco Use    Smoking status: Never    Smokeless tobacco: Never   Vaping Use    Vaping status: Never Used   Substance Use Topics    Alcohol use: Yes     Comment: 1 glass wine per week    Drug use: No      Social History "     Substance and Sexual Activity   Drug Use No     FAMILY HISTORY  History reviewed. No pertinent family history.    CURRENT MEDICATIONS  Home Medications       Reviewed by Judith Soto R.N. (Registered Nurse) on 09/15/24 at 0326  Med List Status: Partial     Medication Last Dose Status   ADDERALL XR, 20MG, 20 MG per XR capsule  Active   amphetamine-dextroamphetamine (ADDERALL) 5 MG Tab  Active   cephALEXin (KEFLEX) 500 MG Cap  Active   liothyronine (CYTOMEL) 5 MCG Tab  Active   Vortioxetine HBr (TRINTELLIX) 10 MG Tab  Active   Vortioxetine HBr (TRINTELLIX) 10 MG Tab  Active                  Audit from Redirected Encounters    **Home medications have not yet been reviewed for this encounter**       ALLERGIES  Allergies   Allergen Reactions    Skin Adhesives Hives, Itching, Rash and Swelling       PHYSICAL EXAM    VITAL SIGNS:   Vitals:    09/15/24 0335 09/15/24 0350 09/15/24 0400 09/15/24 0402   BP: 117/64 118/67  105/58   Pulse: 73 73 81 76   Resp: 17 18 18 16   Temp:       TempSrc:       SpO2: 100% 99% (!) 87% 98%   Weight:       Height:         Vitals: My interpretation: normotensive, not tachycardic, afebrile, not hypoxic    Reinterpretation of vitals: Unchanged unremarkable    Cardiac Monitor Interpretation: The cardiac monitor revealed normal Sinus Rhythm as interpreted by me. The cardiac monitor was ordered secondary to the patient's history of receiving sedative medication and to monitor for dysrhythmia and/or tachycardia.    PE:   Gen: appears uncomfortable, speaking clearly, appears in mild distress   ENT: Mucous membranes moist, posterior pharynx clear, uvula midline, nares patent bilaterally   Neck: Supple, FROM  Pulmonary: Lungs are clear to auscultation bilaterally. No tachypnea  CV:  RRR, no murmur appreciated, pulses 2+ in both upper and lower extremities  Abdomen: soft, tenderness in the epigastrum, ND; no rebound/guarding  : no CVA or suprapubic tenderness   Neuro: A&Ox4 (person,  place, time, situation), speech fluent, gait steady, no focal deficits appreciated  Skin: No rash or lesions.  No pallor or jaundice.  No cyanosis.  Warm and dry.   Hemoccult positive rectal exam.    DIAGNOSTIC STUDIES / PROCEDURES    LABS  Results for orders placed or performed during the hospital encounter of 09/15/24   CBC WITH DIFFERENTIAL   Result Value Ref Range    WBC 15.9 (H) 4.8 - 10.8 K/uL    RBC 4.55 4.20 - 5.40 M/uL    Hemoglobin 14.6 12.0 - 16.0 g/dL    Hematocrit 41.6 37.0 - 47.0 %    MCV 91.4 81.4 - 97.8 fL    MCH 32.1 27.0 - 33.0 pg    MCHC 35.1 32.2 - 35.5 g/dL    RDW 41.2 35.9 - 50.0 fL    Platelet Count 337 164 - 446 K/uL    MPV 9.4 9.0 - 12.9 fL    Neutrophils-Polys 89.00 (H) 44.00 - 72.00 %    Lymphocytes 5.70 (L) 22.00 - 41.00 %    Monocytes 4.50 0.00 - 13.40 %    Eosinophils 0.00 0.00 - 6.90 %    Basophils 0.30 0.00 - 1.80 %    Immature Granulocytes 0.50 0.00 - 0.90 %    Nucleated RBC 0.00 0.00 - 0.20 /100 WBC    Neutrophils (Absolute) 14.12 (H) 1.82 - 7.42 K/uL    Lymphs (Absolute) 0.91 (L) 1.00 - 4.80 K/uL    Monos (Absolute) 0.71 0.00 - 0.85 K/uL    Eos (Absolute) 0.00 0.00 - 0.51 K/uL    Baso (Absolute) 0.05 0.00 - 0.12 K/uL    Immature Granulocytes (abs) 0.08 0.00 - 0.11 K/uL    NRBC (Absolute) 0.00 K/uL   COMP METABOLIC PANEL   Result Value Ref Range    Sodium 136 135 - 145 mmol/L    Potassium 3.8 3.6 - 5.5 mmol/L    Chloride 99 96 - 112 mmol/L    Co2 23 20 - 33 mmol/L    Anion Gap 14.0 7.0 - 16.0    Glucose 121 (H) 65 - 99 mg/dL    Bun 13 8 - 22 mg/dL    Creatinine 0.77 0.50 - 1.40 mg/dL    Calcium 9.5 8.5 - 10.5 mg/dL    Correct Calcium 9.2 8.5 - 10.5 mg/dL    AST(SGOT) 25 12 - 45 U/L    ALT(SGPT) 13 2 - 50 U/L    Alkaline Phosphatase 55 30 - 99 U/L    Total Bilirubin 0.7 0.1 - 1.5 mg/dL    Albumin 4.4 3.2 - 4.9 g/dL    Total Protein 7.2 6.0 - 8.2 g/dL    Globulin 2.8 1.9 - 3.5 g/dL    A-G Ratio 1.6 g/dL   LIPASE   Result Value Ref Range    Lipase 24 11 - 82 U/L   TROPONIN   Result  Value Ref Range    Troponin T <6 6 - 19 ng/L   ESTIMATED GFR   Result Value Ref Range    GFR (CKD-EPI) 95 >60 mL/min/1.73 m 2   EKG   Result Value Ref Range    Report       [facility] Emergency Dept.    Test Date:  2024-09-15  Pt Name:    ANAND SERRANO    Department: ER  MRN:        [id number]                      Room:       [facility]  Gender:     Female                       Technician: [id number]  :        [id number]                   Requested By:ER TRIAGE PROTOCOL  Order #:    [id number]                    Reading MD: Ayden Banks    Measurements  Intervals                                Axis  Rate:       68                           P:          85  ME:         145                          QRS:        85  QRSD:       90                           T:          63  QT:         417  QTc:        444    Interpretive Statements  Sinus rhythm  Minimal ST depression, inferior leads  No previous ECG available for comparison  Electronically Signed On 09- 03:38:33 PDT by Ayden Banks        All labs reviewed by me. Labs were compared to prior labs if they were available. Significant for mild leukocytosis of 16, no anemia, normal electrolytes, normal glucose, normal renal function, normal liver enzymes, normal bilirubin, lipase normal, troponin negative.       RADIOLOGY  I have independently interpreted the diagnostic imaging associated with this visit and am waiting the final reading from the radiologist.   My preliminary interpretation is a follows: No signs of pancreatitis, no free air, no signs of kidney stones  Radiologist interpretation is as follows:  CT-ABDOMEN-PELVIS WITH   Final Result         1.  Gallbladder hydrops..      2.  Mild constipation      US-RUQ    (Results Pending)     COURSE & MEDICAL DECISION MAKING  Nursing notes, VS, PMSFHx, labs, imaging, EKG reviewed in chart.    ED Observation Status? No; Patient does not meet criteria for ED Observation.     Ddx: Peptic  ulcer disease, gastritis, gastroenteritis, pancreatitis, cholecystitis, choledocholithiasis    MDM: 3:40 AM Dorothy Nova is a 49 y.o. female who presented with acute severe epigastric pain that started yesterday.  Patient denies prior history of this.  Feels like a gnawing tearing sensation in epigastrium.  Does not drink heavy alcohol consumption, dressed casually, no other drug or tobacco products.  She is healthy.  Takes no medications.  Upon arrival here she is afebrile has normal vital signs and denies a history of fever.  She looks very uncomfortable upon arrival and was given Dilaudid, Zofran for symptomatology.  She is tender in the epigastrium.  She reports dark tarry stools and she is Hemoccult positive concerning for gastric ulcer disease.  She is given a dose of Protonix, labs and CT imaging ordered and she will be plan for admission for endoscopy and GI consultation in the morning. All labs reviewed by me. Labs were compared to prior labs if they were available. Significant for mild leukocytosis of 16, no anemia, normal electrolytes, normal glucose, normal renal function, normal liver enzymes, normal bilirubin, lipase normal, troponin negative.  CT imaging shows gallbladder hydrops and mild constipation but no other focal findings or acute findings.  Patient informed on plan for admission is amenable.  Hospitalist consulted and is amenable for admission.  Did page general surgery to asked them to follow along the case regarding gallbladder hydrops in case this is contributing to her epigastric pain.  However I do think that her likely peptic ulcer disease is the main cause of her symptoms.  General surgery recommends they will follow along but primarily want her GI bleed taken care of first but they will evaluate her for potential surgery if deemed necessary.  Patient resting comfortably, admission, pain controlled with Dilaudid    ADDITIONAL PROBLEM LIST AND DISPOSITION    I have discussed  management of the patient with the following physicians and JORGE's: Hospitalist    Discussion of management with other QHP or appropriate source(s): None     Barriers to care at this time, including but not limited to:  None known .     Decision tools and prescription drugs considered including, but not limited to: Pain Medications Dilaudid .    FINAL IMPRESSION  1. Acute upper GI bleed Acute   2. Epigastric pain Acute   3. Leukocytosis, unspecified type Acute   4. Gallbladder hydrops Acute      IColleen (Scribe), am scribing for, and in the presence of, Ayden Banks.    Electronically signed by: Colleen Swain (Scribe), 9/15/2024    I, Ayden Banks personally performed the services described in this documentation, as scribed by Colleen Swain in my presence, and it is both accurate and complete.    The note accurately reflects work and decisions made by me.  Ayden Banks  9/15/2024  4:56 AM

## 2024-09-15 NOTE — PROGRESS NOTES
Report received from NOC RN and assumed patient care at 0700. Patient is A&Ox 4, on room air, and patient resting comfortably in bed. Patient reporting a pain level of 4/10. Call light within reach and bed in lowest position. Reinforced the need to call for assistance. Plan of care discussed and patient does not have any further needs at this time.

## 2024-09-15 NOTE — ED NOTES
Report given to transport. Pt A&Ox4, VSS, on RA. Pt off unit in [facility] with  and all belongings.

## 2024-09-16 ENCOUNTER — ANESTHESIA EVENT (OUTPATIENT)
Dept: SURGERY | Facility: MEDICAL CENTER | Age: 49
DRG: 418 | End: 2024-09-16
Payer: COMMERCIAL

## 2024-09-16 LAB
ALBUMIN SERPL BCP-MCNC: 3.2 G/DL (ref 3.2–4.9)
ALBUMIN/GLOB SERPL: 1.6 G/DL
ALP SERPL-CCNC: 46 U/L (ref 30–99)
ALT SERPL-CCNC: 12 U/L (ref 2–50)
ANION GAP SERPL CALC-SCNC: 8 MMOL/L (ref 7–16)
APTT PPP: 28.6 SEC (ref 24.7–36)
AST SERPL-CCNC: 18 U/L (ref 12–45)
BASOPHILS # BLD AUTO: 0.7 % (ref 0–1.8)
BASOPHILS # BLD: 0.04 K/UL (ref 0–0.12)
BILIRUB SERPL-MCNC: 0.7 MG/DL (ref 0.1–1.5)
BUN SERPL-MCNC: 7 MG/DL (ref 8–22)
CALCIUM ALBUM COR SERPL-MCNC: 8.3 MG/DL (ref 8.5–10.5)
CALCIUM SERPL-MCNC: 7.7 MG/DL (ref 8.5–10.5)
CHLORIDE SERPL-SCNC: 107 MMOL/L (ref 96–112)
CO2 SERPL-SCNC: 22 MMOL/L (ref 20–33)
CREAT SERPL-MCNC: 0.79 MG/DL (ref 0.5–1.4)
EOSINOPHIL # BLD AUTO: 0.11 K/UL (ref 0–0.51)
EOSINOPHIL NFR BLD: 1.9 % (ref 0–6.9)
ERYTHROCYTE [DISTWIDTH] IN BLOOD BY AUTOMATED COUNT: 45.7 FL (ref 35.9–50)
GFR SERPLBLD CREATININE-BSD FMLA CKD-EPI: 92 ML/MIN/1.73 M 2
GLOBULIN SER CALC-MCNC: 2 G/DL (ref 1.9–3.5)
GLUCOSE SERPL-MCNC: 92 MG/DL (ref 65–99)
HCT VFR BLD AUTO: 34.5 % (ref 37–47)
HGB BLD-MCNC: 11.5 G/DL (ref 12–16)
HGB BLD-MCNC: 11.6 G/DL (ref 12–16)
HGB BLD-MCNC: 12.3 G/DL (ref 12–16)
IMM GRANULOCYTES # BLD AUTO: 0.02 K/UL (ref 0–0.11)
IMM GRANULOCYTES NFR BLD AUTO: 0.3 % (ref 0–0.9)
INR PPP: 1.18 (ref 0.87–1.13)
LYMPHOCYTES # BLD AUTO: 1.96 K/UL (ref 1–4.8)
LYMPHOCYTES NFR BLD: 33.3 % (ref 22–41)
MAGNESIUM SERPL-MCNC: 1.8 MG/DL (ref 1.5–2.5)
MCH RBC QN AUTO: 32.3 PG (ref 27–33)
MCHC RBC AUTO-ENTMCNC: 33.3 G/DL (ref 32.2–35.5)
MCV RBC AUTO: 96.9 FL (ref 81.4–97.8)
MONOCYTES # BLD AUTO: 0.57 K/UL (ref 0–0.85)
MONOCYTES NFR BLD AUTO: 9.7 % (ref 0–13.4)
NEUTROPHILS # BLD AUTO: 3.19 K/UL (ref 1.82–7.42)
NEUTROPHILS NFR BLD: 54.1 % (ref 44–72)
NRBC # BLD AUTO: 0 K/UL
NRBC BLD-RTO: 0 /100 WBC (ref 0–0.2)
PHOSPHATE SERPL-MCNC: 3.1 MG/DL (ref 2.5–4.5)
PLATELET # BLD AUTO: 234 K/UL (ref 164–446)
PMV BLD AUTO: 9.4 FL (ref 9–12.9)
POTASSIUM SERPL-SCNC: 4.5 MMOL/L (ref 3.6–5.5)
PROT SERPL-MCNC: 5.2 G/DL (ref 6–8.2)
PROTHROMBIN TIME: 15.1 SEC (ref 12–14.6)
RBC # BLD AUTO: 3.56 M/UL (ref 4.2–5.4)
SODIUM SERPL-SCNC: 137 MMOL/L (ref 135–145)
WBC # BLD AUTO: 5.9 K/UL (ref 4.8–10.8)

## 2024-09-16 PROCEDURE — 700102 HCHG RX REV CODE 250 W/ 637 OVERRIDE(OP): Performed by: INTERNAL MEDICINE

## 2024-09-16 PROCEDURE — 85730 THROMBOPLASTIN TIME PARTIAL: CPT

## 2024-09-16 PROCEDURE — 85018 HEMOGLOBIN: CPT | Mod: 91

## 2024-09-16 PROCEDURE — 84100 ASSAY OF PHOSPHORUS: CPT

## 2024-09-16 PROCEDURE — 99231 SBSQ HOSP IP/OBS SF/LOW 25: CPT | Mod: 57 | Performed by: NURSE PRACTITIONER

## 2024-09-16 PROCEDURE — 85025 COMPLETE CBC W/AUTO DIFF WBC: CPT

## 2024-09-16 PROCEDURE — 83735 ASSAY OF MAGNESIUM: CPT

## 2024-09-16 PROCEDURE — 700111 HCHG RX REV CODE 636 W/ 250 OVERRIDE (IP): Performed by: INTERNAL MEDICINE

## 2024-09-16 PROCEDURE — 36415 COLL VENOUS BLD VENIPUNCTURE: CPT

## 2024-09-16 PROCEDURE — 700105 HCHG RX REV CODE 258: Performed by: INTERNAL MEDICINE

## 2024-09-16 PROCEDURE — 85610 PROTHROMBIN TIME: CPT

## 2024-09-16 PROCEDURE — 80053 COMPREHEN METABOLIC PANEL: CPT

## 2024-09-16 PROCEDURE — A9270 NON-COVERED ITEM OR SERVICE: HCPCS | Performed by: INTERNAL MEDICINE

## 2024-09-16 PROCEDURE — 99233 SBSQ HOSP IP/OBS HIGH 50: CPT | Performed by: INTERNAL MEDICINE

## 2024-09-16 PROCEDURE — 770006 HCHG ROOM/CARE - MED/SURG/GYN SEMI*

## 2024-09-16 RX ORDER — LANOLIN ALCOHOL/MO/W.PET/CERES
400 CREAM (GRAM) TOPICAL 2 TIMES DAILY
Status: COMPLETED | OUTPATIENT
Start: 2024-09-16 | End: 2024-09-16

## 2024-09-16 RX ORDER — BUTALBITAL, ACETAMINOPHEN AND CAFFEINE 50; 325; 40 MG/1; MG/1; MG/1
1 TABLET ORAL EVERY 6 HOURS PRN
Status: DISCONTINUED | OUTPATIENT
Start: 2024-09-16 | End: 2024-09-17 | Stop reason: HOSPADM

## 2024-09-16 RX ADMIN — SUCRALFATE 1 G: 1 TABLET ORAL at 11:54

## 2024-09-16 RX ADMIN — OXYCODONE HYDROCHLORIDE 5 MG: 5 TABLET ORAL at 22:43

## 2024-09-16 RX ADMIN — Medication 400 MG: at 08:05

## 2024-09-16 RX ADMIN — SENNOSIDES AND DOCUSATE SODIUM 2 TABLET: 50; 8.6 TABLET ORAL at 16:49

## 2024-09-16 RX ADMIN — OXYCODONE HYDROCHLORIDE 5 MG: 5 TABLET ORAL at 08:06

## 2024-09-16 RX ADMIN — SODIUM CHLORIDE: 9 INJECTION, SOLUTION INTRAVENOUS at 10:51

## 2024-09-16 RX ADMIN — POLYETHYLENE GLYCOL 3350 1 PACKET: 17 POWDER, FOR SOLUTION ORAL at 16:49

## 2024-09-16 RX ADMIN — Medication 400 MG: at 16:49

## 2024-09-16 RX ADMIN — PANTOPRAZOLE SODIUM 40 MG: 40 INJECTION, POWDER, FOR SOLUTION INTRAVENOUS at 05:52

## 2024-09-16 RX ADMIN — ACETAMINOPHEN 650 MG: 325 TABLET ORAL at 04:58

## 2024-09-16 RX ADMIN — Medication 1 TABLET: at 08:05

## 2024-09-16 RX ADMIN — SODIUM CHLORIDE: 9 INJECTION, SOLUTION INTRAVENOUS at 21:07

## 2024-09-16 RX ADMIN — PANTOPRAZOLE SODIUM 40 MG: 40 INJECTION, POWDER, FOR SOLUTION INTRAVENOUS at 17:50

## 2024-09-16 RX ADMIN — OXYCODONE HYDROCHLORIDE 5 MG: 5 TABLET ORAL at 15:43

## 2024-09-16 RX ADMIN — OXYCODONE HYDROCHLORIDE 5 MG: 5 TABLET ORAL at 19:31

## 2024-09-16 RX ADMIN — BUTALBITAL, ACETAMINOPHEN AND CAFFEINE 1 TABLET: 325; 50; 40 TABLET ORAL at 10:18

## 2024-09-16 RX ADMIN — SUCRALFATE 1 G: 1 TABLET ORAL at 04:58

## 2024-09-16 RX ADMIN — SUCRALFATE 1 G: 1 TABLET ORAL at 23:07

## 2024-09-16 RX ADMIN — SUCRALFATE 1 G: 1 TABLET ORAL at 17:50

## 2024-09-16 RX ADMIN — OXYCODONE HYDROCHLORIDE 5 MG: 5 TABLET ORAL at 12:03

## 2024-09-16 RX ADMIN — BUTALBITAL, ACETAMINOPHEN AND CAFFEINE 1 TABLET: 325; 50; 40 TABLET ORAL at 16:49

## 2024-09-16 ASSESSMENT — PATIENT HEALTH QUESTIONNAIRE - PHQ9
SUM OF ALL RESPONSES TO PHQ9 QUESTIONS 1 AND 2: 0
2. FEELING DOWN, DEPRESSED, IRRITABLE, OR HOPELESS: NOT AT ALL
1. LITTLE INTEREST OR PLEASURE IN DOING THINGS: NOT AT ALL

## 2024-09-16 ASSESSMENT — PAIN DESCRIPTION - PAIN TYPE
TYPE: ACUTE PAIN

## 2024-09-16 ASSESSMENT — ENCOUNTER SYMPTOMS
CONSTIPATION: 1
BLOOD IN STOOL: 1
ABDOMINAL PAIN: 1
NAUSEA: 1

## 2024-09-16 NOTE — PROGRESS NOTES
Reviewed data, reviewed recommendations from gastroenterology.  Will plan for cholecystectomy tomorrow.  NPO after midnight.     Mukul Padilla MD

## 2024-09-16 NOTE — PROGRESS NOTES
"[facility] Medicine Daily Progress Note    Date of Service  9/16/2024    Chief Complaint  Dorothy Nova is a 49 y.o. female admitted 9/15/2024 with   Chief Complaint   Patient presents with    Abdominal Pain     Pt presents with epigastric pain radiating to mid back. Pt describes pain as \"tearing.\" Reports taking Pepto, Tums, and Pepcid which made pain worse.        Hospital Course  No notes on file    Dorothy Nova is a 49 y.o. female with past medical history of  hypothyroidism, who presented 9/15/2024 with complaints of epigastric abdominal pain severe, nausea, for 1 day.  In ER she had rectal exam that showed melena, guaiac positive..  CT of the abdomen and pelvis with contrast showed gallbladder hydrops, mild constipation.  Right upper quadrant ultrasound is pending.  ERP Dr. Jacome consulted with Dr. Barkley/surgery who will follow with the patient for gallbladder hydrops.  Patient denies taking aspirin or NSAID.  She will be admitted to the hospital for GI bleed.  Of note, in ER she desaturated to 87%, was placed on 2 L nasal cannula.  She received GI cocktail, Dilaudid 1 mg, Zofran, pantoprazole 80 mg IV    Interval Problem Update  Patient was seen and examined at bedside.  I have personally reviewed and interpreted vitals, labs, and imaging.    9/15.  Afebrile.  Hypotensive this morning.  On room air.  Replete potassium.  Denies fever, chills or chest pains, shortness of breath.  Abdominal pain is improved.  Had some bad nausea and vomiting last night.  No further bowel movements today.  Already had breakfast today.  Case was discussed with GI and general surgery.  N.p.o. after midnight.  9/16.  Afebrile.  Stable vitals.  On room air.  Replete mag, calcium.  Denies fevers, chills or chest pains, shortness of breath.  Tolerating full liquids.  Plan for lap sil tomorrow.  Has not had bowel movement.  Is passing flatulence.  Has not had recurrence of bloody stools.  N.p.o. after midnight for lap " sil  Wbc 15.9 > 5.9  Hgb 14.6 > 12.2 > 11.5    I have discussed this patient's plan of care and discharge plan at IDT rounds today with Case Management, Nursing, Nursing leadership, and other members of the IDT team.    Consultants/Specialty  general surgery and GI    Code Status  Full Code    Disposition  The patient is not medically cleared for discharge to home or a post-acute facility.  Anticipate discharge to: home with organized home healthcare and close outpatient follow-up    I have placed the appropriate orders for post-discharge needs.    Review of Systems  Review of Systems   Gastrointestinal:  Positive for abdominal pain, blood in stool, constipation, melena and nausea.        Physical Exam  Temp:  [36.2 °C (97.2 °F)-36.6 °C (97.9 °F)] 36.4 °C (97.5 °F)  Pulse:  [61-76] 61  Resp:  [14-18] 18  BP: ()/(59-64) 110/59  SpO2:  [96 %-100 %] 99 %    Physical Exam  Vitals and nursing note reviewed.   Constitutional:       Appearance: Normal appearance. She is ill-appearing.   HENT:      Head: Normocephalic and atraumatic.      Right Ear: External ear normal.      Left Ear: External ear normal.      Nose: Nose normal.      Mouth/Throat:      Mouth: Mucous membranes are moist.      Pharynx: Oropharynx is clear. No oropharyngeal exudate or posterior oropharyngeal erythema.   Eyes:      Extraocular Movements: Extraocular movements intact.      Conjunctiva/sclera: Conjunctivae normal.   Cardiovascular:      Rate and Rhythm: Normal rate and regular rhythm.      Pulses: Normal pulses.      Heart sounds: Normal heart sounds. No murmur heard.  Pulmonary:      Effort: Pulmonary effort is normal. No respiratory distress.      Breath sounds: Normal breath sounds. No stridor. No wheezing or rales.   Abdominal:      General: Abdomen is flat. Bowel sounds are normal. There is no distension.      Palpations: Abdomen is soft. There is no mass.      Tenderness: There is abdominal tenderness.   Musculoskeletal:       Cervical back: Normal range of motion.   Skin:     General: Skin is warm.      Capillary Refill: Capillary refill takes less than 2 seconds.   Neurological:      General: No focal deficit present.      Mental Status: She is alert and oriented to person, place, and time. Mental status is at baseline.      Cranial Nerves: No cranial nerve deficit.   Psychiatric:         Mood and Affect: Mood normal.         Behavior: Behavior normal.         Fluids    Intake/Output Summary (Last 24 hours) at 9/16/2024 0607  Last data filed at 9/15/2024 1900  Gross per 24 hour   Intake 300 ml   Output --   Net 300 ml        Laboratory  Recent Labs     09/15/24  0341 09/15/24  1209 09/15/24  2134 09/16/24  0519   WBC 15.9*  --   --  5.9   RBC 4.55  --   --  3.56*   HEMOGLOBIN 14.6 12.2 11.1* 11.5*   HEMATOCRIT 41.6  --   --  34.5*   MCV 91.4  --   --  96.9   MCH 32.1  --   --  32.3   MCHC 35.1  --   --  33.3   RDW 41.2  --   --  45.7   PLATELETCT 337  --   --  234   MPV 9.4  --   --  9.4     Recent Labs     09/15/24  0341 09/16/24  0519   SODIUM 136 137   POTASSIUM 3.8 4.5   CHLORIDE 99 107   CO2 23 22   GLUCOSE 121* 92   BUN 13 7*   CREATININE 0.77 0.79   CALCIUM 9.5 7.7*     Recent Labs     09/16/24  0519   APTT 28.6   INR 1.18*               Imaging  US-RUQ   Final Result      1.  Hepatomegaly.      2.  Cholelithiasis with evidence of acute cholecystitis.      3.  Minimal pelviectasis of the right kidney.      CT-ABDOMEN-PELVIS WITH   Final Result         1.  Gallbladder hydrops..      2.  Mild constipation           Assessment/Plan  * GI bleed- (present on admission)  Assessment & Plan  9/16/2024  49-year-old female  presented with severe epigastric pain, nausea.  CT of the abdomen negative for gastric perforation  On rectal exam patient has melena, guaiac positive  Plan: IV Protonix 20 mg twice daily, sucralfate  Nausea as needed  IV fluid support.  Trend H&H  Await aspirin NSAID or anticoagulation.  GI is following    Gallbladder  hydrops  Assessment & Plan  9/16/2024  CT of the abdomen showed gallbladder hydrops.  Pending right upper quadrant ultrasound  Dr. Banks/ERP consulted with Dr. Barkley    Hypoxia  Assessment & Plan  9/16/2024  Desaturated to 87%, on 2 L nasal cannula now.  Question secondary to IV Dilaudid  Will check chest x-ray.  Pulse ox, wean off oxygen as tolerated.    Leukocytosis  Assessment & Plan  9/16/2024  WBC 15.9  Probably reactive.  Will check chest x-ray, UA  Monitor.  Resolved         VTE prophylaxis: Hold anticoagulation with concern for GI bleed and plan for surgery tomorrow    I have performed a physical exam and reviewed and updated ROS and Plan today (9/16/2024). In review of yesterday's note (9/15/2024), there are no changes except as documented above.    Greater than 51 minutes spent prepping to see patient (e.g. review of tests) obtaining and/or reviewing separately obtained history. Performing a medically appropriate examination and/ evaluation.  Counseling and educating the patient/family/caregiver.  Ordering medications, tests, or procedures.  Referring and communicating with other health care professionals.  Documenting clinical information in EPIC.  Independently interpreting results and communicating results to patient/family/caregiver.  Care coordination.

## 2024-09-16 NOTE — PROGRESS NOTES
GI team reviewed chart this morning.     - There has been no evidence of acute GI bleeding, no melena, hematochezia, or hematemesis documented  - Planning for laparoscopic cholecystectomy by surgical team  - We will sign off at this time, please call us back if she develops acute GI bleeding    ..Lizzette Banks, REGINA,  APRN  ..

## 2024-09-16 NOTE — DISCHARGE PLANNING
Physical Therapist Assistant Treatment Note     Name: Renea NICOLE [place]  Clinic Number: [id number]    Therapy Diagnosis:   Encounter Diagnoses   Name Primary?    Acute pain of right shoulder     Posture imbalance     Neck muscle weakness     Weakness of both hips      Physician: Harish Nieves MD    Visit Date: 9/2/2020   Physician Orders: PT Eval and Treat  Medical Diagnosis from Referral  M46.1 (ICD-10-CM) - Sacroiliitis, not elsewhere classified   M79.12 (ICD-10-CM) - Myalgia of auxiliary muscles, head and neck     Evaluation Date: 7/27/2020  Authorization Period Expiration:7/28/21  Plan of Care Expiration: 9/26/2020  Visit # / Visits authorized: 8/20    Time In: 9:50  Time Out: 10:30  Total Billable Time: 40 minutes    Precautions: Standard    Subjective     Pt reports: her low back hurt yesterday after standing in line at post office for 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 Pain went away with rest. She does not have pain if she is moving around, just standing still. Feeling better today.   She was partially compliant with home exercise program.  Response to previous treatment: minimal soreness  Functional change: none at this time    Pain: 0/10  Location: bilateral back      Objective     Renea received therapeutic exercises to develop flexibility, posture, core stabilization, strength for 40 minutes including:    PPT w/hold-dead bugs 2x8  Squats 10x3 10# kettle bell to 16 inch  Bridges-single 8x2 each  Prone hip extension 2x10  Paloff Press 2x10 20#  Lifting technique with box and squatting technique with 10# KB  Dead lift-30# 8x3  ZOEY hip ext/ABD-RTB  MEDX 40# 0-60 1x15, 1x12 reps  Matrix knee ext 10# 2x10    Home Exercises Provided and Patient Education Provided     Education provided:   -Abdominal bracing w/transitional movements  -Lifting/ squatting technique    Written Home Exercises Provided: Patient instructed to cont prior HEP.  Exercises were reviewed and Renea was able to demonstrate them prior to the end of the  Care Transition Team Assessment    CM RN met with pt at bedside to complete discharge assessment and chart review was completed to obtain the information used in this assessment. CM RN Introduced self and department roles. Pt is A/Ox4 and agreeable to assessment. Pt verified information on face sheet.     - Prior to this admission, pt stated she lived in a single story house with her spouse, Will Nova, at the address verified as [address] [place] [address]. Pt stated she plans to return home with spouse upon medical clearance.   - Per pt, Will is good support for discharge back into community and he is to provide her with transportation home upon medical clearance.   - Pt stated to be independent with ADL/IADLs and drives self-prior to this admission.   - Per pt, no DME or O2 was owned/used prior to this admission.    - Pt is employed full time and is insured through [employer] and [employer]. Pt provided [employer] insurance card and it was scanned into the system.   - PCP is Lavonne Weller M.D.   - Preferred pharmacy is [facility] [place]    Anticipate patient will discharge home with no CM RN needs identified at this time. CM RN will continue to follow for discharge planning needs/support.      Information Source  Information Given By: Patient  Who is responsible for making decisions for patient? : Patient    Readmission Evaluation  Is this a readmission?: No    Elopement Risk  Legal Hold: No  Ambulatory or Self Mobile in Wheelchair: Yes  Disoriented: No  Psychiatric Symptoms: None  History of Wandering: No  Elopement this Admit: No  Vocalizing Wanting to Leave: No  Displays Behaviors, Body Language Wanting to Leave: No-Not at Risk for Elopement  Elopement Risk: Not at Risk for Elopement    Interdisciplinary Discharge Planning  Lives with - Patient's Self Care Capacity: Spouse  Patient or legal guardian wants to designate a caregiver: No  Support Systems:  None  Housing / Facility: [address]    Discharge Preparedness  What is your plan after discharge?: Home with help  What are your discharge supports?: Spouse  Prior Functional Level: Ambulatory, Independent with Activities of Daily Living, Independent with Medication Management  Difficulity with ADLs: None  Difficulity with IADLs: None    Functional Assesment  Prior Functional Level: Ambulatory, Independent with Activities of Daily Living, Independent with Medication Management    Finances  Financial Barriers to Discharge: No  Prescription Coverage: Yes    Vision / Hearing Impairment  Vision Impairment : No  Hearing Impairment : No    Advance Directive  Advance Directive?: None    Domestic Abuse  Have you ever been the victim of abuse or violence?: No  Possible Abuse/Neglect Reported to:: Not Applicable    Discharge Risks or Barriers  Discharge risks or barriers?: Complex medical needs  Patient risk factors: Complex medical needs    Anticipated Discharge Information  Discharge Disposition: Discharged to home/self care (01)         session.  Renea demonstrated good  understanding of the education provided.     See EMR under Patient Instructions for exercises provided prior visit.    Assessment     Renea presents with no reports of significant pain. She has improved mechanics with assisting  for transfers which has helped with low back pain. She has good carry-over and shows improvement in body mechanics for dead lift and squats. She feels fatigued after therapy and will benefit from continued LE strengthening, core stability and endurance.  Renea is progressing well towards her goals.   Pt prognosis is Good.     Pt will continue to benefit from skilled outpatient physical therapy to address the deficits listed in the problem list box on initial evaluation, provide pt/family education and to maximize pt's level of independence in the home and community environment.     Pt's spiritual, cultural and educational needs considered and pt agreeable to plan of care and goals.     Anticipated barriers to physical therapy: none at this time    Goals:   Short Term Goals: 2 weeks   1. Patient will be independent with HEP partially met  2. Patient abby demonstrate correct sitting/standing posture improving     Long Term Goals: 4 weeks   1. Right shoulder strength abby improve to 4+/5 to allow for increased overhead activities   2. Patient will demonstrate correct body mechanics while lifting improving  3. FOTO will improve by 10 points progressing, not met  4. Pain will reduce by 20% progressing, not met  Plan     FOTO next visit    Plan of care Certification: 7/27/2020 to 9/26/2020     Outpatient Physical Therapy 2 times weekly for 4 weeks to include the following interventions: Electrical Stimulation TENS, Manual Therapy, Moist Heat/ Ice, Neuromuscular Re-ed, Patient Education, Therapeutic Activites and Therapeutic Exercise, ASTYM, Kinesiotaping PRN, Functional Dry Needling    Alexa Martinez, PTA

## 2024-09-16 NOTE — DISCHARGE PLANNING
Case Management Discharge Planning    Admission Date: 9/15/2024  GMLOS: 3.1  ALOS: 1    6-Clicks ADL Score: 24  6-Clicks Mobility Score: 24    Anticipated Discharge Dispo: Discharge Disposition: Discharged to home/self care (01)    DME Needed: No    Action(s) Taken:   Pt was discussed during IDT rounds. Per MD, pt is not medically cleared. Plan is for a laparoscopic cholecystectomy tomorrow, 9/17.      Pt's anticipated discharge plan is home with spouse, Will, upon medical clearance.     Escalations Completed: None    Medically Clear: No    Next Steps:  CM RN to follow up with medical team to discuss discharge barriers or needs.     Barriers to Discharge: Medical clearance and Pending Procedures

## 2024-09-16 NOTE — PROGRESS NOTES
PREOPERATIVE NOTE: I have seen and examined the patient today.  Critical physical examination findings, laboratory indices, and radiographic studies reviewed.  I recommend laparoscopic cholecystectomy with possible conversion to open cholecystectomy.    The operative strategy was explained and reviewed. Alternatives discussed. Potential complications including, but not limited to, infection, bleeding, damage to adjacent structures, and anesthetic complications were discussed.  I reviewed that given the severe inflammation and distention of her gallbladder and her small stature she is at above average risk for conversion to open cholecystectomy.  Questions were elicited and answered to the patient's satisfaction.        ____________________________________     Mukul Padilla M.D.    DD: 9/16/2024  12:22 PM

## 2024-09-16 NOTE — DIETARY
"Nutrition services: Day 1 of admit.  Dorothy Nova is a 49 y.o. female with admitting DX of GI Bleed.  Consult received for BMI <19.    Pt denies unplanned weight loss. She states she lost some weight intentionally. She reports a good appetite and a healthy diet. She is currently tolerating a full liquid diet and understands the plan for NPO after midnight.    Assessment:  Height: 165.1 cm (5' 5\")  Weight: 51.7 kg (114 lb)  Body mass index is 18.97 kg/m²., BMI classification: Normal  Diet/Intake: Full Liquid, NPO at Midnight    Evaluation:   Pt to be NPO at midnight for lap sil.  BMI is <19 but within normal range.   Pt appears well nourished.    Malnutrition Risk: ASPEN criteria not met.    Recommendations/Plan:  Diet advancement per MD post-op.   Encourage intake of meals >50%.  Document intake of all meals as % taken in ADL's to provide interdisciplinary communication across all shifts.   Monitor weight.  Nutrition rep will continue to see patient for ongoing meal and snack preferences.     RD to monitor per department policy.      "

## 2024-09-16 NOTE — CARE PLAN
The patient is Watcher - Medium risk of patient condition declining or worsening    Shift Goals  Clinical Goals: monitor H&Hs, NPO at MN, control pain  Patient Goals: control pain and get some sleep  Family Goals: KARLA    Progress made toward(s) clinical / shift goals:    Problem: Pain - Standard  Goal: Alleviation of pain or a reduction in pain to the patient’s comfort goal  Outcome: Progressing       Patient is not progressing towards the following goals:    No acute changes this shift. VSS. Afebrile. Pt continues to endorse abd. Pain which is relieved by PRN oxy. IVF given as ordered. Pt NPO since MN for GI procedure in AM. H&H obtained as ordered and continue to trend down. Pt endorsed no BM this shift. Safety maintained with bed in low position and call bell in reach.

## 2024-09-16 NOTE — PROGRESS NOTES
"  DATE: 9/16/2024    Hospital Day1  epigastric pain in the setting of possible GI bleed .    INTERVAL EVENTS:  Denies BM  No significant drop in hemoglobin  GI recommendations reviewed  Moderate epigastric pain    REVIEW OF SYSTEMS:  Comprehensive review of systems is negative with the exception of the aforementioned HPI, PMH, and PSH bullets in accordance with CMS guidelines.    PHYSICAL EXAMINATION:  Vital Signs: /55   Pulse 75   Temp 36.8 °C (98.2 °F) (Temporal)   Resp 16   Ht 1.651 m (5' 5\")   Wt 51.7 kg (114 lb)   SpO2 100%   Physical Exam  Abdominal:      General: There is distension (mild).      Tenderness: There is abdominal tenderness (epigastric).         LABORATORY VALUES:  Recent Labs     09/15/24  0341 09/15/24  1209 09/15/24  2134 09/16/24 0519   WBC 15.9*  --   --  5.9   RBC 4.55  --   --  3.56*   HEMOGLOBIN 14.6 12.2 11.1* 11.5*   HEMATOCRIT 41.6  --   --  34.5*   MCV 91.4  --   --  96.9   MCH 32.1  --   --  32.3   MCHC 35.1  --   --  33.3   RDW 41.2  --   --  45.7   PLATELETCT 337  --   --  234   MPV 9.4  --   --  9.4     Recent Labs     09/15/24  0341 09/16/24  0519   SODIUM 136 137   POTASSIUM 3.8 4.5   CHLORIDE 99 107   CO2 23 22   GLUCOSE 121* 92   BUN 13 7*   CREATININE 0.77 0.79   CALCIUM 9.5 7.7*     Recent Labs     09/15/24  0341 09/16/24  0519   ASTSGOT 25 18   ALTSGPT 13 12   TBILIRUBIN 0.7 0.7   ALKPHOSPHAT 55 46   GLOBULIN 2.8 2.0   INR  --  1.18*     Recent Labs     09/16/24 0519   APTT 28.6   INR 1.18*       IMAGING:  US-RUQ   Final Result      1.  Hepatomegaly.      2.  Cholelithiasis with evidence of acute cholecystitis.      3.  Minimal pelviectasis of the right kidney.      CT-ABDOMEN-PELVIS WITH   Final Result         1.  Gallbladder hydrops..      2.  Mild constipation          ASSESSMENT AND PLAN:  Cholecystitis    - Lap sil pending, timing to be determined        ____________________________________     SHRUTHI Rutherford    DD: 9/16/2024  8:33 AM   "

## 2024-09-16 NOTE — CARE PLAN
The patient is Stable - Low risk of patient condition declining or worsening    Shift Goals  Clinical Goals: Monitor stool output; pain will be controlled at 4/10 or less after pharmacological intervention  Patient Goals: Pain will be controlled; participate in plan of care  Family Goals: KARLA    Progress made toward(s) clinical / shift goals:      Patient has remained free from S/S on bleeding throughout the day. Patient has reported pain of 4/10 or less after pharmacological intervention (see MAR).     Patient is not progressing towards the following goals:

## 2024-09-16 NOTE — CARE PLAN
The patient is Watcher - Medium risk of patient condition declining or worsening    Shift Goals  Clinical Goals: Pt will have all needs met prior to surgery by the time of possible surgery/procedure today.  Patient Goals: medicatoin for headache, management of headache and abd pain.  Family Goals: updates.    Progress made toward(s) clinical / shift goals:  Pt to speak to surgeon, plan for procedure tomorrow. Communication with hospitalist about pt plan throughout day, plan for pt to eat after talking to surgeon, pt able to tolerate some food, surgeon states may not tolerate regular diet and full liquids placed. Orders for NPO at midnight with sips.     Patient is not progressing towards the following goals: progressing.

## 2024-09-17 ENCOUNTER — PHARMACY VISIT (OUTPATIENT)
Dept: PHARMACY | Facility: MEDICAL CENTER | Age: 49
End: 2024-09-17
Payer: MEDICARE

## 2024-09-17 ENCOUNTER — ANESTHESIA (OUTPATIENT)
Dept: SURGERY | Facility: MEDICAL CENTER | Age: 49
DRG: 418 | End: 2024-09-17
Payer: COMMERCIAL

## 2024-09-17 VITALS
SYSTOLIC BLOOD PRESSURE: 105 MMHG | WEIGHT: 114 LBS | DIASTOLIC BLOOD PRESSURE: 67 MMHG | HEART RATE: 74 BPM | TEMPERATURE: 97.8 F | OXYGEN SATURATION: 97 % | RESPIRATION RATE: 13 BRPM | HEIGHT: 65 IN | BODY MASS INDEX: 18.99 KG/M2

## 2024-09-17 PROBLEM — K81.0 ACUTE CHOLECYSTITIS: Status: ACTIVE | Noted: 2024-09-15

## 2024-09-17 LAB
ALBUMIN SERPL BCP-MCNC: 3.2 G/DL (ref 3.2–4.9)
ALBUMIN/GLOB SERPL: 1.8 G/DL
ALP SERPL-CCNC: 42 U/L (ref 30–99)
ALT SERPL-CCNC: 11 U/L (ref 2–50)
ANION GAP SERPL CALC-SCNC: 9 MMOL/L (ref 7–16)
AST SERPL-CCNC: 18 U/L (ref 12–45)
BILIRUB SERPL-MCNC: 0.5 MG/DL (ref 0.1–1.5)
BUN SERPL-MCNC: 5 MG/DL (ref 8–22)
CALCIUM ALBUM COR SERPL-MCNC: 8.1 MG/DL (ref 8.5–10.5)
CALCIUM SERPL-MCNC: 7.5 MG/DL (ref 8.5–10.5)
CHLORIDE SERPL-SCNC: 109 MMOL/L (ref 96–112)
CO2 SERPL-SCNC: 21 MMOL/L (ref 20–33)
CREAT SERPL-MCNC: 0.67 MG/DL (ref 0.5–1.4)
ERYTHROCYTE [DISTWIDTH] IN BLOOD BY AUTOMATED COUNT: 43.3 FL (ref 35.9–50)
GFR SERPLBLD CREATININE-BSD FMLA CKD-EPI: 108 ML/MIN/1.73 M 2
GLOBULIN SER CALC-MCNC: 1.8 G/DL (ref 1.9–3.5)
GLUCOSE SERPL-MCNC: 86 MG/DL (ref 65–99)
HCG SERPL QL: NEGATIVE
HCT VFR BLD AUTO: 34.2 % (ref 37–47)
HGB BLD-MCNC: 11.2 G/DL (ref 12–16)
MAGNESIUM SERPL-MCNC: 1.8 MG/DL (ref 1.5–2.5)
MCH RBC QN AUTO: 31.5 PG (ref 27–33)
MCHC RBC AUTO-ENTMCNC: 32.7 G/DL (ref 32.2–35.5)
MCV RBC AUTO: 96.1 FL (ref 81.4–97.8)
PATHOLOGY CONSULT NOTE: NORMAL
PHOSPHATE SERPL-MCNC: 2.9 MG/DL (ref 2.5–4.5)
PLATELET # BLD AUTO: 154 K/UL (ref 164–446)
PMV BLD AUTO: 10.9 FL (ref 9–12.9)
POTASSIUM SERPL-SCNC: 3.9 MMOL/L (ref 3.6–5.5)
PROT SERPL-MCNC: 5 G/DL (ref 6–8.2)
RBC # BLD AUTO: 3.56 M/UL (ref 4.2–5.4)
SODIUM SERPL-SCNC: 139 MMOL/L (ref 135–145)
WBC # BLD AUTO: 6.2 K/UL (ref 4.8–10.8)

## 2024-09-17 PROCEDURE — 160029 HCHG SURGERY MINUTES - 1ST 30 MINS LEVEL 4: Performed by: STUDENT IN AN ORGANIZED HEALTH CARE EDUCATION/TRAINING PROGRAM

## 2024-09-17 PROCEDURE — 99233 SBSQ HOSP IP/OBS HIGH 50: CPT | Performed by: STUDENT IN AN ORGANIZED HEALTH CARE EDUCATION/TRAINING PROGRAM

## 2024-09-17 PROCEDURE — 700101 HCHG RX REV CODE 250: Performed by: STUDENT IN AN ORGANIZED HEALTH CARE EDUCATION/TRAINING PROGRAM

## 2024-09-17 PROCEDURE — 700105 HCHG RX REV CODE 258: Performed by: STUDENT IN AN ORGANIZED HEALTH CARE EDUCATION/TRAINING PROGRAM

## 2024-09-17 PROCEDURE — 47562 LAPAROSCOPIC CHOLECYSTECTOMY: CPT | Performed by: STUDENT IN AN ORGANIZED HEALTH CARE EDUCATION/TRAINING PROGRAM

## 2024-09-17 PROCEDURE — 83735 ASSAY OF MAGNESIUM: CPT

## 2024-09-17 PROCEDURE — 160048 HCHG OR STATISTICAL LEVEL 1-5: Performed by: STUDENT IN AN ORGANIZED HEALTH CARE EDUCATION/TRAINING PROGRAM

## 2024-09-17 PROCEDURE — 88304 TISSUE EXAM BY PATHOLOGIST: CPT

## 2024-09-17 PROCEDURE — 700102 HCHG RX REV CODE 250 W/ 637 OVERRIDE(OP): Performed by: INTERNAL MEDICINE

## 2024-09-17 PROCEDURE — 160002 HCHG RECOVERY MINUTES (STAT): Performed by: STUDENT IN AN ORGANIZED HEALTH CARE EDUCATION/TRAINING PROGRAM

## 2024-09-17 PROCEDURE — A9270 NON-COVERED ITEM OR SERVICE: HCPCS | Performed by: INTERNAL MEDICINE

## 2024-09-17 PROCEDURE — 84703 CHORIONIC GONADOTROPIN ASSAY: CPT

## 2024-09-17 PROCEDURE — 47562 LAPAROSCOPIC CHOLECYSTECTOMY: CPT | Mod: AS | Performed by: NURSE PRACTITIONER

## 2024-09-17 PROCEDURE — 80053 COMPREHEN METABOLIC PANEL: CPT

## 2024-09-17 PROCEDURE — RXMED WILLOW AMBULATORY MEDICATION CHARGE: Performed by: STUDENT IN AN ORGANIZED HEALTH CARE EDUCATION/TRAINING PROGRAM

## 2024-09-17 PROCEDURE — 700111 HCHG RX REV CODE 636 W/ 250 OVERRIDE (IP): Mod: JZ | Performed by: STUDENT IN AN ORGANIZED HEALTH CARE EDUCATION/TRAINING PROGRAM

## 2024-09-17 PROCEDURE — 700102 HCHG RX REV CODE 250 W/ 637 OVERRIDE(OP): Performed by: STUDENT IN AN ORGANIZED HEALTH CARE EDUCATION/TRAINING PROGRAM

## 2024-09-17 PROCEDURE — 99239 HOSP IP/OBS DSCHRG MGMT >30: CPT | Performed by: STUDENT IN AN ORGANIZED HEALTH CARE EDUCATION/TRAINING PROGRAM

## 2024-09-17 PROCEDURE — 700111 HCHG RX REV CODE 636 W/ 250 OVERRIDE (IP): Performed by: STUDENT IN AN ORGANIZED HEALTH CARE EDUCATION/TRAINING PROGRAM

## 2024-09-17 PROCEDURE — A9270 NON-COVERED ITEM OR SERVICE: HCPCS | Performed by: STUDENT IN AN ORGANIZED HEALTH CARE EDUCATION/TRAINING PROGRAM

## 2024-09-17 PROCEDURE — 84100 ASSAY OF PHOSPHORUS: CPT

## 2024-09-17 PROCEDURE — 36415 COLL VENOUS BLD VENIPUNCTURE: CPT

## 2024-09-17 PROCEDURE — 160009 HCHG ANES TIME/MIN: Performed by: STUDENT IN AN ORGANIZED HEALTH CARE EDUCATION/TRAINING PROGRAM

## 2024-09-17 PROCEDURE — 700111 HCHG RX REV CODE 636 W/ 250 OVERRIDE (IP): Performed by: INTERNAL MEDICINE

## 2024-09-17 PROCEDURE — 160041 HCHG SURGERY MINUTES - EA ADDL 1 MIN LEVEL 4: Performed by: STUDENT IN AN ORGANIZED HEALTH CARE EDUCATION/TRAINING PROGRAM

## 2024-09-17 PROCEDURE — 85027 COMPLETE CBC AUTOMATED: CPT

## 2024-09-17 PROCEDURE — 160035 HCHG PACU - 1ST 60 MINS PHASE I: Performed by: STUDENT IN AN ORGANIZED HEALTH CARE EDUCATION/TRAINING PROGRAM

## 2024-09-17 PROCEDURE — 0FT44ZZ RESECTION OF GALLBLADDER, PERCUTANEOUS ENDOSCOPIC APPROACH: ICD-10-PCS | Performed by: STUDENT IN AN ORGANIZED HEALTH CARE EDUCATION/TRAINING PROGRAM

## 2024-09-17 RX ORDER — ROCURONIUM BROMIDE 10 MG/ML
INJECTION, SOLUTION INTRAVENOUS PRN
Status: DISCONTINUED | OUTPATIENT
Start: 2024-09-17 | End: 2024-09-17 | Stop reason: SURG

## 2024-09-17 RX ORDER — SUCCINYLCHOLINE CHLORIDE 20 MG/ML
INJECTION INTRAMUSCULAR; INTRAVENOUS PRN
Status: DISCONTINUED | OUTPATIENT
Start: 2024-09-17 | End: 2024-09-17 | Stop reason: SURG

## 2024-09-17 RX ORDER — ONDANSETRON 4 MG/1
4 TABLET, FILM COATED ORAL EVERY 6 HOURS PRN
Qty: 30 TABLET | Refills: 0 | Status: SHIPPED | OUTPATIENT
Start: 2024-09-17

## 2024-09-17 RX ORDER — MIDAZOLAM HYDROCHLORIDE 1 MG/ML
INJECTION INTRAMUSCULAR; INTRAVENOUS PRN
Status: DISCONTINUED | OUTPATIENT
Start: 2024-09-17 | End: 2024-09-17 | Stop reason: SURG

## 2024-09-17 RX ORDER — HYDROMORPHONE HYDROCHLORIDE 1 MG/ML
0.1 INJECTION, SOLUTION INTRAMUSCULAR; INTRAVENOUS; SUBCUTANEOUS
Status: DISCONTINUED | OUTPATIENT
Start: 2024-09-17 | End: 2024-09-17 | Stop reason: HOSPADM

## 2024-09-17 RX ORDER — ONDANSETRON 2 MG/ML
4 INJECTION INTRAMUSCULAR; INTRAVENOUS
Status: COMPLETED | OUTPATIENT
Start: 2024-09-17 | End: 2024-09-17

## 2024-09-17 RX ORDER — HYDROMORPHONE HYDROCHLORIDE 1 MG/ML
0.4 INJECTION, SOLUTION INTRAMUSCULAR; INTRAVENOUS; SUBCUTANEOUS
Status: DISCONTINUED | OUTPATIENT
Start: 2024-09-17 | End: 2024-09-17 | Stop reason: HOSPADM

## 2024-09-17 RX ORDER — KETOROLAC TROMETHAMINE 15 MG/ML
INJECTION, SOLUTION INTRAMUSCULAR; INTRAVENOUS PRN
Status: DISCONTINUED | OUTPATIENT
Start: 2024-09-17 | End: 2024-09-17 | Stop reason: SURG

## 2024-09-17 RX ORDER — DIPHENHYDRAMINE HYDROCHLORIDE 50 MG/ML
12.5 INJECTION INTRAMUSCULAR; INTRAVENOUS
Status: DISCONTINUED | OUTPATIENT
Start: 2024-09-17 | End: 2024-09-17 | Stop reason: HOSPADM

## 2024-09-17 RX ORDER — OXYCODONE HCL 5 MG/5 ML
5 SOLUTION, ORAL ORAL
Status: COMPLETED | OUTPATIENT
Start: 2024-09-17 | End: 2024-09-17

## 2024-09-17 RX ORDER — BUPIVACAINE HYDROCHLORIDE AND EPINEPHRINE 5; 5 MG/ML; UG/ML
INJECTION, SOLUTION EPIDURAL; INTRACAUDAL; PERINEURAL
Status: DISCONTINUED | OUTPATIENT
Start: 2024-09-17 | End: 2024-09-17 | Stop reason: HOSPADM

## 2024-09-17 RX ORDER — OXYCODONE HCL 5 MG/5 ML
10 SOLUTION, ORAL ORAL
Status: COMPLETED | OUTPATIENT
Start: 2024-09-17 | End: 2024-09-17

## 2024-09-17 RX ORDER — MAGNESIUM SULFATE HEPTAHYDRATE 40 MG/ML
INJECTION, SOLUTION INTRAVENOUS PRN
Status: DISCONTINUED | OUTPATIENT
Start: 2024-09-17 | End: 2024-09-17 | Stop reason: SURG

## 2024-09-17 RX ORDER — OXYCODONE HYDROCHLORIDE 5 MG/1
5 TABLET ORAL EVERY 8 HOURS PRN
Qty: 15 TABLET | Refills: 0 | Status: SHIPPED | OUTPATIENT
Start: 2024-09-17 | End: 2024-09-22

## 2024-09-17 RX ORDER — LIDOCAINE HYDROCHLORIDE 20 MG/ML
INJECTION, SOLUTION EPIDURAL; INFILTRATION; INTRACAUDAL; PERINEURAL PRN
Status: DISCONTINUED | OUTPATIENT
Start: 2024-09-17 | End: 2024-09-17 | Stop reason: SURG

## 2024-09-17 RX ORDER — HYDROMORPHONE HYDROCHLORIDE 1 MG/ML
0.2 INJECTION, SOLUTION INTRAMUSCULAR; INTRAVENOUS; SUBCUTANEOUS
Status: DISCONTINUED | OUTPATIENT
Start: 2024-09-17 | End: 2024-09-17 | Stop reason: HOSPADM

## 2024-09-17 RX ORDER — SODIUM CHLORIDE, SODIUM LACTATE, POTASSIUM CHLORIDE, CALCIUM CHLORIDE 600; 310; 30; 20 MG/100ML; MG/100ML; MG/100ML; MG/100ML
INJECTION, SOLUTION INTRAVENOUS
Status: DISCONTINUED | OUTPATIENT
Start: 2024-09-17 | End: 2024-09-17 | Stop reason: SURG

## 2024-09-17 RX ORDER — CEFAZOLIN SODIUM 1 G/3ML
INJECTION, POWDER, FOR SOLUTION INTRAMUSCULAR; INTRAVENOUS PRN
Status: DISCONTINUED | OUTPATIENT
Start: 2024-09-17 | End: 2024-09-17 | Stop reason: SURG

## 2024-09-17 RX ORDER — ONDANSETRON 2 MG/ML
INJECTION INTRAMUSCULAR; INTRAVENOUS PRN
Status: DISCONTINUED | OUTPATIENT
Start: 2024-09-17 | End: 2024-09-17 | Stop reason: SURG

## 2024-09-17 RX ORDER — HALOPERIDOL 5 MG/ML
1 INJECTION INTRAMUSCULAR
Status: DISCONTINUED | OUTPATIENT
Start: 2024-09-17 | End: 2024-09-17 | Stop reason: HOSPADM

## 2024-09-17 RX ORDER — MEPERIDINE HYDROCHLORIDE 25 MG/ML
6.25 INJECTION INTRAMUSCULAR; INTRAVENOUS; SUBCUTANEOUS
Status: DISCONTINUED | OUTPATIENT
Start: 2024-09-17 | End: 2024-09-17 | Stop reason: HOSPADM

## 2024-09-17 RX ORDER — HYDROMORPHONE HYDROCHLORIDE 2 MG/ML
INJECTION, SOLUTION INTRAMUSCULAR; INTRAVENOUS; SUBCUTANEOUS PRN
Status: DISCONTINUED | OUTPATIENT
Start: 2024-09-17 | End: 2024-09-17 | Stop reason: SURG

## 2024-09-17 RX ORDER — DEXAMETHASONE SODIUM PHOSPHATE 4 MG/ML
INJECTION, SOLUTION INTRA-ARTICULAR; INTRALESIONAL; INTRAMUSCULAR; INTRAVENOUS; SOFT TISSUE PRN
Status: DISCONTINUED | OUTPATIENT
Start: 2024-09-17 | End: 2024-09-17 | Stop reason: SURG

## 2024-09-17 RX ADMIN — ROCURONIUM BROMIDE 15 MG: 50 INJECTION, SOLUTION INTRAVENOUS at 07:55

## 2024-09-17 RX ADMIN — POLYETHYLENE GLYCOL 3350 1 PACKET: 17 POWDER, FOR SOLUTION ORAL at 10:50

## 2024-09-17 RX ADMIN — ONDANSETRON 4 MG: 2 INJECTION INTRAMUSCULAR; INTRAVENOUS at 07:56

## 2024-09-17 RX ADMIN — LIDOCAINE HYDROCHLORIDE 60 MG: 20 INJECTION, SOLUTION EPIDURAL; INFILTRATION; INTRACAUDAL at 07:37

## 2024-09-17 RX ADMIN — PROPOFOL 150 MG: 10 INJECTION, EMULSION INTRAVENOUS at 07:39

## 2024-09-17 RX ADMIN — PROPOFOL 30 MG: 10 INJECTION, EMULSION INTRAVENOUS at 08:30

## 2024-09-17 RX ADMIN — OXYCODONE HYDROCHLORIDE 5 MG: 5 TABLET ORAL at 04:48

## 2024-09-17 RX ADMIN — SENNOSIDES AND DOCUSATE SODIUM 2 TABLET: 50; 8.6 TABLET ORAL at 18:17

## 2024-09-17 RX ADMIN — FENTANYL CITRATE 50 MCG: 50 INJECTION, SOLUTION INTRAMUSCULAR; INTRAVENOUS at 07:37

## 2024-09-17 RX ADMIN — OXYCODONE HYDROCHLORIDE 5 MG: 5 TABLET ORAL at 15:41

## 2024-09-17 RX ADMIN — DEXAMETHASONE SODIUM PHOSPHATE 4 MG: 4 INJECTION INTRA-ARTICULAR; INTRALESIONAL; INTRAMUSCULAR; INTRAVENOUS; SOFT TISSUE at 07:56

## 2024-09-17 RX ADMIN — FENTANYL CITRATE 50 MCG: 50 INJECTION, SOLUTION INTRAMUSCULAR; INTRAVENOUS at 07:48

## 2024-09-17 RX ADMIN — Medication 1 TABLET: at 05:57

## 2024-09-17 RX ADMIN — ACETAMINOPHEN 650 MG: 325 TABLET ORAL at 01:45

## 2024-09-17 RX ADMIN — SODIUM CHLORIDE, POTASSIUM CHLORIDE, SODIUM LACTATE AND CALCIUM CHLORIDE: 600; 310; 30; 20 INJECTION, SOLUTION INTRAVENOUS at 07:40

## 2024-09-17 RX ADMIN — CEFAZOLIN 2 G: 1 INJECTION, POWDER, FOR SOLUTION INTRAMUSCULAR; INTRAVENOUS at 07:41

## 2024-09-17 RX ADMIN — ONDANSETRON 4 MG: 2 INJECTION INTRAMUSCULAR; INTRAVENOUS at 09:41

## 2024-09-17 RX ADMIN — HYDROMORPHONE HYDROCHLORIDE 0.6 MG: 2 INJECTION INTRAMUSCULAR; INTRAVENOUS; SUBCUTANEOUS at 08:09

## 2024-09-17 RX ADMIN — SUGAMMADEX 200 MG: 100 INJECTION, SOLUTION INTRAVENOUS at 08:31

## 2024-09-17 RX ADMIN — HYDROMORPHONE HYDROCHLORIDE 0.4 MG: 2 INJECTION INTRAMUSCULAR; INTRAVENOUS; SUBCUTANEOUS at 07:59

## 2024-09-17 RX ADMIN — OXYCODONE HYDROCHLORIDE 5 MG: 5 TABLET ORAL at 12:08

## 2024-09-17 RX ADMIN — KETOROLAC TROMETHAMINE 15 MG: 15 INJECTION, SOLUTION INTRAMUSCULAR; INTRAVENOUS at 08:22

## 2024-09-17 RX ADMIN — PANTOPRAZOLE SODIUM 40 MG: 40 INJECTION, POWDER, FOR SOLUTION INTRAVENOUS at 05:57

## 2024-09-17 RX ADMIN — MAGNESIUM SULFATE HEPTAHYDRATE 2 G: 4 INJECTION, SOLUTION INTRAVENOUS at 08:04

## 2024-09-17 RX ADMIN — PROMETHAZINE HYDROCHLORIDE 25 MG: 25 TABLET ORAL at 10:22

## 2024-09-17 RX ADMIN — OXYCODONE HYDROCHLORIDE 5 MG: 5 TABLET ORAL at 01:45

## 2024-09-17 RX ADMIN — PROPOFOL 30 MG: 10 INJECTION, EMULSION INTRAVENOUS at 08:26

## 2024-09-17 RX ADMIN — OXYCODONE HYDROCHLORIDE 10 MG: 5 SOLUTION ORAL at 08:57

## 2024-09-17 RX ADMIN — MIDAZOLAM HYDROCHLORIDE 2 MG: 2 INJECTION, SOLUTION INTRAMUSCULAR; INTRAVENOUS at 07:33

## 2024-09-17 RX ADMIN — SUCCINYLCHOLINE CHLORIDE 125 MG: 20 INJECTION, SOLUTION INTRAMUSCULAR; INTRAVENOUS at 07:39

## 2024-09-17 ASSESSMENT — COGNITIVE AND FUNCTIONAL STATUS - GENERAL
DAILY ACTIVITIY SCORE: 24
SUGGESTED CMS G CODE MODIFIER DAILY ACTIVITY: CH
MOBILITY SCORE: 24
SUGGESTED CMS G CODE MODIFIER MOBILITY: CH

## 2024-09-17 ASSESSMENT — PAIN DESCRIPTION - PAIN TYPE
TYPE: SURGICAL PAIN
TYPE: ACUTE PAIN
TYPE: ACUTE PAIN
TYPE: SURGICAL PAIN
TYPE: ACUTE PAIN
TYPE: SURGICAL PAIN
TYPE: SURGICAL PAIN
TYPE: ACUTE PAIN
TYPE: SURGICAL PAIN
TYPE: ACUTE PAIN

## 2024-09-17 ASSESSMENT — ENCOUNTER SYMPTOMS
ABDOMINAL PAIN: 1
NAUSEA: 1
CONSTIPATION: 1
BLOOD IN STOOL: 1

## 2024-09-17 NOTE — CARE PLAN
Problem: Pain - Standard  Goal: Alleviation of pain or a reduction in pain to the patient’s comfort goal  Outcome: Progressing     Problem: Bowel Elimination  Goal: Establish and maintain regular bowel function  Outcome: Progressing     Problem: Mobility  Goal: Patient's capacity to carry out activities will improve  Outcome: Progressing   The patient is Watcher - Medium risk of patient condition declining or worsening    Shift Goals  Clinical Goals: pain control, tolerate PO  Patient Goals: pain control  Family Goals: KARLA    Progress made toward(s) clinical / shift goals:      Pain controlled with prn PO pain medication, tolerating clear liquid diet - advanced for lunch, ambulating post op without issue    Patient is not progressing towards the following goals:

## 2024-09-17 NOTE — DISCHARGE SUMMARY
"Discharge Summary    CHIEF COMPLAINT ON ADMISSION  Chief Complaint   Patient presents with    Abdominal Pain     Pt presents with epigastric pain radiating to mid back. Pt describes pain as \"tearing.\" Reports taking Pepto, Tums, and Pepcid which made pain worse.        Reason for Admission  Abdominal Pain; Tarry Stool     Admission Date  9/15/2024    CODE STATUS  Full Code    HPI & HOSPITAL COURSE  Dorothy Nova is a 49 y.o. female with past medical history of  hypothyroidism, who presented 9/15/2024 with complaints of severe epigastric abdominal pain. In ER she had rectal exam that showed melena, guaiac positive..CT of the abdomen and pelvis with contrast showed gallbladder hydrops, mild constipation.  Right upper quadrant ultrasound showed Cholelithiasis with evidence of acute cholecystitis.  Dr. Barkley/surgery was consulted. Of note, in ER she desaturated to 87%, was placed on 2 L nasal cannula. S.p laparoscopic cholecystectomy 9/17.  Patient recovered well.  Surgery cleared the patient for discharge.     GI consulted for possible melena.  GI recommended continue PPI twice daily, defer EGD at this point due to no evidence of brisk bleeding and stable hemoglobin.  I have placed outpatient GI referral    Therefore, she is discharged in good and stable condition to home with close outpatient follow-up.    The patient met 2-midnight criteria for an inpatient stay at the time of discharge.    Discharge Date  9/17/2024    FOLLOW UP ITEMS POST DISCHARGE  - Follow up with primary care physician in 1 week.   - Follow up with general surgery as instructed  -Follow-up with GI as instructed  - Please take the medications as instructed    - Go to the local Emergency Department if you have any worsening condition.       DISCHARGE DIAGNOSES  Principal Problem:    GI bleed (POA: Yes)  Active Problems:    Leukocytosis (POA: Unknown)    Hypoxia (POA: Unknown)    Acute cholecystitis (POA: Unknown)  Resolved Problems:    * No " resolved hospital problems. *      FOLLOW UP  No future appointments.  [facility]  [address]  [place] [address]  [phone]    Schedule an appointment as soon as possible for a visit in 2 week(s)  ACS follow up clinic    Lavonne Weller M.D.  [address]  [place] [address]  [phone]    Follow up in 1 week(s)  recent hospitalization follow up, recheck lab      MEDICATIONS ON DISCHARGE     Medication List        START taking these medications        Instructions   omeprazole 20 MG delayed-release capsule  Commonly known as: PriLOSEC   Take 1 Capsule by mouth 2 times a day for 30 days.  Dose: 20 mg     oxyCODONE immediate-release 5 MG Tabs  Commonly known as: Roxicodone   Take 1 Tablet by mouth every 8 hours as needed for Severe Pain for up to 5 days.  Dose: 5 mg            CONTINUE taking these medications        Instructions   * amphetamine-dextroamphetamine 5 MG Tabs  Commonly known as: Adderall   Take 5 mg by mouth 1 time a day as needed.  Dose: 5 mg     * amphetamine-dextroamphetamine XR 10 MG Cp24  Commonly known as: Adderall XR   Take 10 mg by mouth every morning.  Dose: 10 mg     bismuth subsalicylate 262 MG/15ML Susp  Commonly known as: Pepto-Bismol   Take 30 mL by mouth every 6 hours as needed.  Dose: 30 mL     calcium carbonate 500 MG Chew  Commonly known as: Tums   Chew 500 mg as needed.  Dose: 500 mg           * This list has 2 medication(s) that are the same as other medications prescribed for you. Read the directions carefully, and ask your doctor or other care provider to review them with you.                STOP taking these medications      famotidine 20 MG Tabs  Commonly known as: Pepcid              Allergies  Allergies   Allergen Reactions    Skin Adhesives Hives, Rash, Itching and Swelling             DIET  Orders Placed This Encounter   Procedures    Diet Order Diet: Low Fiber(GI Soft)     Standing Status:   Standing     Number of Occurrences:   1     Order  Specific Question:   Diet:     Answer:   Low Fiber(GI Soft) [2]       ACTIVITY  As tolerated.  Weight bearing as tolerated    CONSULTATIONS  GI, general surgery    PROCEDURES  Lap cholecystectomy    LABORATORY  Lab Results   Component Value Date    SODIUM 139 09/17/2024    POTASSIUM 3.9 09/17/2024    CHLORIDE 109 09/17/2024    CO2 21 09/17/2024    GLUCOSE 86 09/17/2024    BUN 5 (L) 09/17/2024    CREATININE 0.67 09/17/2024        Lab Results   Component Value Date    WBC 6.2 09/17/2024    HEMOGLOBIN 11.2 (L) 09/17/2024    HEMATOCRIT 34.2 (L) 09/17/2024    PLATELETCT 154 (L) 09/17/2024        Total time of the discharge process exceeds 35 minutes.

## 2024-09-17 NOTE — PROGRESS NOTES
Pt pain controlled with prn PO pain medication, ambulated approx 200ft around unit without issue, voided.   If tolerating diet, pt is cleared for discharge from surgery standpoint per YULY Samayoa, P.A.     Also per YULY Samayoa PA - pt must still be cleared by GI.   Will communicate this with Hospitalist. Pt updated and agreeable to plan of care.

## 2024-09-17 NOTE — PROGRESS NOTES
PREOPERATIVE NOTE: I have seen and examined the patient today.  Critical physical examination findings, laboratory indices, and radiographic studies reviewed.  I recommend laparoscopic cholecystectomy with possible conversion to open.    The operative strategy was explained and reviewed. Alternatives discussed. Potential complications including, but not limited to, infection, bleeding, damage to adjacent structures, and anesthetic complications were discussed. Questions were elicited and answered to the patient's satisfaction.        ____________________________________     Mukul Padilla M.D.    DD: 9/17/2024  7:42 AM

## 2024-09-17 NOTE — ANESTHESIA PREPROCEDURE EVALUATION
Case: [id number] Date/Time: 09/17/24 0715    Procedure: CHOLECYSTECTOMY, LAPAROSCOPIC - POSSIBLE CONVERSION TO OPEN (Abdomen)    Anesthesia type: General    Location: [place] [address] / SURGERY [facility]    Surgeons: Mukul Padilla M.D.            Relevant Problems   No relevant active problems       Physical Exam    Airway   Mallampati: II  TM distance: >3 FB  Neck ROM: full       Cardiovascular - normal exam  Rhythm: regular  Rate: normal  (-) murmur     Dental - normal exam           Pulmonary - normal exam  Breath sounds clear to auscultation     Abdominal    Neurological - normal exam                   Anesthesia Plan    ASA 2       Plan - general       Airway plan will be ETT          Induction: intravenous    Postoperative Plan: Postoperative administration of opioids is intended.    Pertinent diagnostic labs and testing reviewed    Informed Consent:    Anesthetic plan and risks discussed with patient.    Use of blood products discussed with: patient whom consented to blood products.

## 2024-09-17 NOTE — ANESTHESIA POSTPROCEDURE EVALUATION
Patient: Dorothy Nova    Procedure Summary       Date: 09/17/24 Room / Location: [facility] 03 / SURGERY [facility]    Anesthesia Start: 0733 Anesthesia Stop: 0846    Procedure: CHOLECYSTECTOMY, LAPAROSCOPIC (Abdomen) Diagnosis: (Cholecystitis)    Surgeons: Muklu Padilla M.D. Responsible Provider: Dudley Queen M.D.    Anesthesia Type: general ASA Status: 2            Final Anesthesia Type: general  Last vitals  BP   Blood Pressure: 105/67    Temp   36.6 °C (97.8 °F)    Pulse   74   Resp   13    SpO2   97 %      Anesthesia Post Evaluation    Patient location during evaluation: PACU  Patient participation: complete - patient participated  Level of consciousness: awake and alert    Airway patency: patent  Anesthetic complications: no  Cardiovascular status: hemodynamically stable  Respiratory status: acceptable  Hydration status: euvolemic    PONV: none

## 2024-09-17 NOTE — CARE PLAN
The patient is Stable - Low risk of patient condition declining or worsening    Shift Goals  Clinical Goals: Patients pain will be controlled to comfort level with intervention; pt will be NPO at midnight  Patient Goals: pain control  Family Goals: KARLA    Progress made toward(s) clinical / shift goals: Patients received medication per MAR q3H. Pain controlled for awhile with intervention, but returned multiple times. Pt NPO at midnight for procedure in morning.       Problem: Pain - Standard  Goal: Alleviation of pain or a reduction in pain to the patient’s comfort goal  Outcome: Progressing     Problem: Knowledge Deficit - Standard  Goal: Patient and family/care givers will demonstrate understanding of plan of care, disease process/condition, diagnostic tests and medications  Outcome: Progressing       Patient is not progressing towards the following goals:

## 2024-09-17 NOTE — OR NURSING
0844- Pt arrives to PACU from OR on 6L of oxygen via mask. Report received. 3 sites to abd CDI. Pt denies pain at this time.     0858- Pt medicated for pain per MAR. Pt tolerating sips of water.     0907- Pt  Jason called and updated on POC and pt status.    0922- Report called to Celeste YARBROUGH.     0944- Pt taken to room by transport, pt awake and comfortable upon leaving PACU. Surigcal sites CDI.

## 2024-09-17 NOTE — DISCHARGE INSTRUCTIONS
- Follow up with primary care physician in 1 week.   - Follow up with general surgery as instructed  -Follow-up with GI as instructed - GI recommended continue PPI twice daily, defer EGD at this point due to no evidence of brisk bleeding and stable hemoglobin.  I have placed outpatient GI referral   - Please take the medications as instructed    - Go to the local Emergency Department if you have any worsening condition.

## 2024-09-17 NOTE — PROGRESS NOTES
0955 - Pt arrived back to unit from PACU in stable condition. 3 lap sites with dermabond noted to abd - CDI. Minimal pain 3/10, declines further interventions for pain. Requesting something for nausea - to be medicated per MAR order. On clear liquid diet, advance as tolerated.

## 2024-09-17 NOTE — OP REPORT
DATE OF OPERATION:   9/17/2024     PREOPERATIVE DIAGNOSIS: acute cholecystitis.    POSTOPERATIVE DIAGNOSIS: acute cholecystitis.    PROCEDURE PERFORMED: Laparoscopic cholecystectomy    SURGEON:    Mukul Padilla M.D.    ASSISTANT:    DAPHNE Brown.     ANESTHESIOLOGIST:  Dudley Queen MD    ANESTHESIA:   General endotracheal anesthesia.    ASA CLASSIFICATION:  II.    INDICATIONS: The patient is a 49 year-old woman with clinical and radiographic findings of acute cholecystitis. She is taken to the operating room for a laparoscopic cholecystectomy.     The nature of the surgical procedure warranted additional skilled operative assistance from an Advanced Registered Nurse Practitioner (ARNP). The assistant was present during the entire operation. The surgical assistant performed the following: provided assistance with optimal surgical exposure of the operative field and provided high complexity, subspecialty decision making input.     FINDINGS: Moderate inflammation and edema of the gallbladder.    WOUND CLASSIFICATION:  Class II, Clean Contaminated.    SPECIMEN:    Gallbladder.    ESTIMATED BLOOD LOSS:  20 mL.    PROCEDURE: Following informed consent consent, the patient was properly identified, taken to the operating room and placed in supine position where general endotracheal anesthesia was administered. Intravenous antibiotics were administered by the anesthesiologist in correct time interval. The patient voided prior to surgery. A urinary catheter was not placed. Sequential compression devices were employed. The abdomen was prepped and draped into a sterile field. A timeout was conducted with the full attention and participation of all operating room personnel.    Marcaine 0.5% was used to infiltrate the port sites. A 5 mm incision was made just to the right of the umbilicus and a 5mm optical trocar was advanced under laparoscopic guidance. Once confirmed to be in the intraperitoneal cavity, carbon  dioxide pneumoperitoneum was instilled.  A 5 mm 30 degree lens and camera was passed into the peritoneal cavity. An 11 mm port was placed in the epigastric midline under direct vision. Two 5 mm right subcostal ports were placed under direct vision.     The gallbladder was identified and elevated.  Flimsy adhesions between the gallbladder and duodenum were bluntly taken down.  Dissection was carried out to completely expose and delineate the hepatocystic triangle. The critical view was achieved definitively identifying the single cystic duct and single cystic artery entering the gallbladder. These structures were multiply clipped proximally, once distally and divided. The gallbladder was dissected free from the undersurface of the liver using electrocautery and placed within a laparoscopic specimen retrieval bag. The gallbladder was delivered intact from the abdominal cavity and submitted for pathology.  The gallbladder fossa was inspected. Hemostasis was satisfactory.    The epigastric port site fascia was approximated using a trocar site closure device with a 0 VICRYL® Plus Antibacterial suture.    The patient tolerated the procedure well, and there were no apparent complications. All sponge, needle, and instrument counts were correct on 2 separate occasions. The patient was awakened, extubated, and transferred to  to the post anesthesia care unit (PACU) in satisfactory condition.       ____________________________________     Mukul Padilla M.D.    DD: 9/17/2024  8:42 AM

## 2024-09-17 NOTE — ANESTHESIA TIME REPORT
Anesthesia Start and Stop Event Times       Date Time Event    9/17/2024 0713 Ready for Procedure     0733 Anesthesia Start     0846 Anesthesia Stop          Responsible Staff  09/17/24      Name Role Begin End    Dudley Queen M.D. Anesth 0733 0846          Overtime Reason:  no overtime (within assigned shift)    Comments:

## 2024-09-17 NOTE — PROGRESS NOTES
"Hospital Medicine Daily Progress Note    Date of Service  9/17/2024    Chief Complaint  Dorothy Nova is a 49 y.o. female admitted 9/15/2024 with   Chief Complaint   Patient presents with    Abdominal Pain     Pt presents with epigastric pain radiating to mid back. Pt describes pain as \"tearing.\" Reports taking Pepto, Tums, and Pepcid which made pain worse.        Hospital Course  No notes on file    Dorothy Nova is a 49 y.o. female with past medical history of  hypothyroidism, who presented 9/15/2024 with complaints of epigastric abdominal pain severe, nausea, for 1 day.  In ER she had rectal exam that showed melena, guaiac positive..  CT of the abdomen and pelvis with contrast showed gallbladder hydrops, mild constipation.  Right upper quadrant ultrasound is pending.  ERP Dr. Jcaome consulted with Dr. Barkley/surgery who will follow with the patient for gallbladder hydrops.  Patient denies taking aspirin or NSAID.  She will be admitted to the hospital for GI bleed.  Of note, in ER she desaturated to 87%, was placed on 2 L nasal cannula.  She received GI cocktail, Dilaudid 1 mg, Zofran, pantoprazole 80 mg IV    Interval Problem Update  I saw and examined the patient at bedside  Patient underwent lap cholecystectomy this morning  She reported the pain is tolerable no nausea vomiting,     No more GI bleeding at.  No EGD indicated at this point  I placed the outpatient referral to GI    Continue PPI    Wbc 15.9 > 5.9  Hgb 14.6 > 12.2 > 11.5    I have discussed this patient's plan of care and discharge plan at IDT rounds today with Case Management, Nursing, Nursing leadership, and other members of the IDT team.    Consultants/Specialty  general surgery and GI    Code Status  Full Code    Disposition  The patient is not medically cleared for discharge to home or a post-acute facility.      I have placed the appropriate orders for post-discharge needs.    Review of Systems  Review of Systems   Gastrointestinal:  " Positive for abdominal pain, blood in stool, constipation, melena and nausea.        Physical Exam  Temp:  [36.1 °C (96.9 °F)-36.7 °C (98 °F)] 36.6 °C (97.8 °F)  Pulse:  [] 74  Resp:  [12-19] 13  BP: (100-120)/(46-67) 105/67  SpO2:  [97 %-100 %] 97 %    Physical Exam  Vitals and nursing note reviewed.   Constitutional:       Appearance: Normal appearance. She is ill-appearing.   HENT:      Head: Normocephalic and atraumatic.      Right Ear: External ear normal.      Left Ear: External ear normal.      Nose: Nose normal.      Mouth/Throat:      Mouth: Mucous membranes are moist.      Pharynx: Oropharynx is clear. No oropharyngeal exudate or posterior oropharyngeal erythema.   Eyes:      Extraocular Movements: Extraocular movements intact.      Conjunctiva/sclera: Conjunctivae normal.   Cardiovascular:      Rate and Rhythm: Normal rate and regular rhythm.      Pulses: Normal pulses.      Heart sounds: Normal heart sounds. No murmur heard.  Pulmonary:      Effort: Pulmonary effort is normal. No respiratory distress.      Breath sounds: Normal breath sounds. No stridor. No wheezing or rales.   Abdominal:      General: Abdomen is flat. Bowel sounds are normal. There is no distension.      Palpations: Abdomen is soft. There is no mass.      Tenderness: There is abdominal tenderness.   Musculoskeletal:      Cervical back: Normal range of motion.   Skin:     General: Skin is warm.      Capillary Refill: Capillary refill takes less than 2 seconds.   Neurological:      General: No focal deficit present.      Mental Status: She is alert and oriented to person, place, and time. Mental status is at baseline.      Cranial Nerves: No cranial nerve deficit.   Psychiatric:         Mood and Affect: Mood normal.         Behavior: Behavior normal.         Fluids    Intake/Output Summary (Last 24 hours) at 9/17/2024 1701  Last data filed at 9/17/2024 1030  Gross per 24 hour   Intake 2210 ml   Output --   Net 2210 ml         Laboratory  Recent Labs     09/15/24  0341 09/15/24  1209 09/16/24  0519 09/16/24  1301 09/16/24  2104 09/17/24  0211   WBC 15.9*  --  5.9  --   --  6.2   RBC 4.55  --  3.56*  --   --  3.56*   HEMOGLOBIN 14.6   < > 11.5* 11.6* 12.3 11.2*   HEMATOCRIT 41.6  --  34.5*  --   --  34.2*   MCV 91.4  --  96.9  --   --  96.1   MCH 32.1  --  32.3  --   --  31.5   MCHC 35.1  --  33.3  --   --  32.7   RDW 41.2  --  45.7  --   --  43.3   PLATELETCT 337  --  234  --   --  154*   MPV 9.4  --  9.4  --   --  10.9    < > = values in this interval not displayed.     Recent Labs     09/15/24  0341 09/16/24  0519 09/17/24 0211   SODIUM 136 137 139   POTASSIUM 3.8 4.5 3.9   CHLORIDE 99 107 109   CO2 23 22 21   GLUCOSE 121* 92 86   BUN 13 7* 5*   CREATININE 0.77 0.79 0.67   CALCIUM 9.5 7.7* 7.5*     Recent Labs     09/16/24 0519   APTT 28.6   INR 1.18*               Imaging  US-RUQ   Final Result      1.  Hepatomegaly.      2.  Cholelithiasis with evidence of acute cholecystitis.      3.  Minimal pelviectasis of the right kidney.      CT-ABDOMEN-PELVIS WITH   Final Result         1.  Gallbladder hydrops..      2.  Mild constipation           Assessment/Plan  * GI bleed- (present on admission)  Assessment & Plan  9/16/2024  49-year-old female  presented with severe epigastric pain, nausea.  CT of the abdomen negative for gastric perforation  On rectal exam patient has melena, guaiac positive  Plan: IV Protonix 20 mg twice daily, sucralfate  Nausea as needed  IV fluid support.  Trend H&H  Await aspirin NSAID or anticoagulation.  GI is following    Acute cholecystitis  Assessment & Plan  9/16/2024  CT of the abdomen showed gallbladder hydrops.  P   Right upper quadrant ultrasound showed Cholelithiasis with evidence of acute cholecystitis.    Dr. Barkley/surgery was consulted.      S.p laparoscopic cholecystectomy 9/17      Hypoxia  Assessment & Plan  9/16/2024  Desaturated to 87%, on 2 L nasal cannula now.  Question secondary to IV  Dilaudid  Will check chest x-ray.  Pulse ox, wean off oxygen as tolerated.    Leukocytosis  Assessment & Plan  9/16/2024  WBC 15.9  Probably reactive.  Will check chest x-ray, UA  Monitor.  Resolved         VTE prophylaxis: Hold anticoagulation with concern for GI bleed and plan for surgery tomorrow    I have performed a physical exam and reviewed and updated ROS and Plan today (9/17/2024). In review of yesterday's note (9/16/2024), there are no changes except as documented above.    Greater than 53 minutes spent prepping to see patient (e.g. review of tests) obtaining and/or reviewing separately obtained history. Performing a medically appropriate examination and/ evaluation.  Counseling and educating the patient/family/caregiver.  Ordering medications, tests, or procedures.  Referring and communicating with other health care professionals.  Documenting clinical information in EPIC.  Independently interpreting results and communicating results to patient/family/caregiver.  Care coordination.   VTE Selection

## 2024-09-17 NOTE — PROGRESS NOTES
Notification to hospitalist Cortez Conner DO, pt has headache with no relief from oxy. Inquiry if fioricet will assist with pain management for pt. New orders placed for fioricet.

## 2024-09-17 NOTE — ANESTHESIA PROCEDURE NOTES
Airway    Date/Time: 9/17/2024 7:40 AM    Performed by: Dudley Queen M.D.  Authorized by: Dudley Queen M.D.    Location:  OR  Urgency:  Elective  Indications for Airway Management:  Anesthesia      Spontaneous Ventilation: absent    Sedation Level:  Deep  Preoxygenated: Yes    Patient Position:  Sniffing  Mask Difficulty Assessment:  0 - not attempted  Final Airway Type:  Endotracheal airway  Final Endotracheal Airway:  ETT  Cuffed: Yes    Technique Used for Successful ETT Placement:  Direct laryngoscopy    Insertion Site:  Oral  Blade Type:  Glide  Laryngoscope Blade/Videolaryngoscope Blade Size:  3  ETT Size (mm):  6.5  Measured from:  Teeth  ETT to Teeth (cm):  18  Placement Verified by: auscultation and capnometry    Cormack-Lehane Classification:  Grade I - full view of glottis  Number of Attempts at Approach:  1

## 2024-09-18 ENCOUNTER — TELEPHONE (OUTPATIENT)
Dept: HEALTH INFORMATION MANAGEMENT | Facility: OTHER | Age: 49
End: 2024-09-18

## 2024-09-18 NOTE — PROGRESS NOTES
Home meds given and reviewed. Requested band aid to cover her 4 lap sites and will remove once home. Offered wheelchair, refused. States she can walk. Reminded to walk slowly and to be careful. Understood and agreed. Discharged home with , ambulatory. AO x 4.

## 2024-09-18 NOTE — PROGRESS NOTES
Pt tolerated regular diet without increased abd pain or n/v - cleared for discharge.     Discharge instructions given to and reviewed with patient and  at bedside, patient verbalizes understanding. IV removed. All home belongings with patient at time of discharge. Meds filled at [facility] pharmacy - awaiting pickup.

## 2024-09-18 NOTE — PROGRESS NOTES
Received RN report as bedside. AO x 4,  at bedside. Stating she is ready to go. Home meds picked up by care aide. Discharge instructions on hand and reviewed.

## 2024-11-08 PROBLEM — M79.644 FINGER PAIN, RIGHT: Status: ACTIVE | Noted: 2024-11-08

## 2025-04-08 ENCOUNTER — APPOINTMENT (OUTPATIENT)
Dept: URGENT CARE | Facility: PHYSICIAN GROUP | Age: 50
End: 2025-04-08
Payer: COMMERCIAL

## (undated) DEVICE — GLOVE BIOGEL SZ 7 SURGICAL PF LTX - (50PR/BX 4BX/CA)

## (undated) DEVICE — GOWN WARMING STANDARD FLEX - (30/CA)

## (undated) DEVICE — SET SUCTION/IRRIGATION WITH DISPOSABLE TIP (6/CA )PART #0250-070-520 IS A SUB

## (undated) DEVICE — CANNULA W/SEAL 5X100 Z-THRE - ADED KII (12/BX)

## (undated) DEVICE — SUCTION INSTRUMENT YANKAUER BULBOUS TIP W/O VENT (50EA/CA)

## (undated) DEVICE — TUBING CLEARLINK DUO-VENT - C-FLO (48EA/CA)

## (undated) DEVICE — BAG RETRIEVAL 10ML (10EA/BX)

## (undated) DEVICE — GLOVE BIOGEL SZ 7.5 SURGICAL PF LTX - (50PR/BX 4BX/CA)

## (undated) DEVICE — SODIUM CHL IRRIGATION 0.9% 1000ML (12EA/CA)

## (undated) DEVICE — COVER LIGHT HANDLE ALC PLUS DISP (18EA/BX)

## (undated) DEVICE — SUTURE GENERAL

## (undated) DEVICE — SUTURE 4-0 MONOCRYL PLUS PS-2 - 27 INCH (36/BX)

## (undated) DEVICE — ELECTRODE DUAL RETURN W/ CORD - (50/PK)

## (undated) DEVICE — SUTURE 0 VICRYL PLUS UR-6 - 27 INCH (36/BX)

## (undated) DEVICE — CLIP MED LG INTNL HRZN TI ESCP - (20/BX)

## (undated) DEVICE — SET EXTENSION WITH 2 PORTS (48EA/CA) ***PART #2C8610 IS A SUBSTITUTE*****

## (undated) DEVICE — TROCAR 5X100 NON BLADED Z-TH - READ KII (6/BX)

## (undated) DEVICE — GLOVE BIOGEL INDICATOR SZ 7SURGICAL PF LTX - (50/BX 4BX/CA)

## (undated) DEVICE — SENSOR OXIMETER ADULT SPO2 RD SET (20EA/BX)

## (undated) DEVICE — PACK LAP CHOLE OR - (2EA/CA)

## (undated) DEVICE — CHLORAPREP 26 ML APPLICATOR - ORANGE TINT(25/CA)

## (undated) DEVICE — GLOVE BIOGEL INDICATOR SZ 8 SURGICAL PF LTX - (50/BX 4BX/CA)

## (undated) DEVICE — SET LEADWIRE 5 LEAD BEDSIDE DISPOSABLE ECG (1SET OF 5/EA)

## (undated) DEVICE — DERMABOND ADVANCED - (12EA/BX)

## (undated) DEVICE — LACTATED RINGERS INJ 1000 ML - (14EA/CA 60CA/PF)

## (undated) DEVICE — CANISTER SUCTION 3000ML MECHANICAL FILTER AUTO SHUTOFF MEDI-VAC NONSTERILE LF DISP (40EA/CA)

## (undated) DEVICE — TROCAR Z THREAD12MM OPTICAL - NON BLADED (6/BX)